# Patient Record
Sex: FEMALE | Race: BLACK OR AFRICAN AMERICAN | Employment: OTHER | URBAN - METROPOLITAN AREA
[De-identification: names, ages, dates, MRNs, and addresses within clinical notes are randomized per-mention and may not be internally consistent; named-entity substitution may affect disease eponyms.]

---

## 2018-01-24 ENCOUNTER — HOSPITAL ENCOUNTER (OUTPATIENT)
Dept: GENERAL RADIOLOGY | Age: 63
Discharge: HOME | End: 2018-01-24
Payer: MEDICARE

## 2018-01-24 DIAGNOSIS — M25.511 SHOULDER PAIN, RIGHT: ICD-10-CM

## 2018-01-24 PROCEDURE — 73030 X-RAY EXAM OF SHOULDER: CPT

## 2018-09-19 ENCOUNTER — OFFICE VISIT (OUTPATIENT)
Dept: SLEEP MEDICINE | Age: 63
End: 2018-09-19

## 2018-09-19 VITALS
DIASTOLIC BLOOD PRESSURE: 82 MMHG | HEIGHT: 66 IN | BODY MASS INDEX: 36.32 KG/M2 | HEART RATE: 75 BPM | WEIGHT: 226 LBS | SYSTOLIC BLOOD PRESSURE: 149 MMHG | OXYGEN SATURATION: 95 %

## 2018-09-19 DIAGNOSIS — I10 ESSENTIAL HYPERTENSION: ICD-10-CM

## 2018-09-19 DIAGNOSIS — E66.01 SEVERE OBESITY (BMI 35.0-39.9): ICD-10-CM

## 2018-09-19 DIAGNOSIS — G47.33 OBSTRUCTIVE SLEEP APNEA (ADULT) (PEDIATRIC): Primary | ICD-10-CM

## 2018-09-19 RX ORDER — PRAVASTATIN SODIUM 40 MG/1
40 TABLET ORAL
COMMUNITY

## 2018-09-19 RX ORDER — ASPIRIN 81 MG/1
TABLET ORAL
COMMUNITY

## 2018-09-19 RX ORDER — GABAPENTIN 300 MG/1
300 CAPSULE ORAL 3 TIMES DAILY
COMMUNITY

## 2018-09-19 RX ORDER — METOPROLOL TARTRATE 25 MG/1
TABLET, FILM COATED ORAL 2 TIMES DAILY
COMMUNITY
End: 2020-02-13

## 2018-09-19 RX ORDER — LISINOPRIL 2.5 MG/1
2.5 TABLET ORAL 2 TIMES DAILY
COMMUNITY

## 2018-09-19 RX ORDER — TRAMADOL HYDROCHLORIDE 50 MG/1
50 TABLET ORAL
Status: ON HOLD | COMMUNITY
End: 2021-10-14 | Stop reason: SDUPTHER

## 2018-09-19 NOTE — PROGRESS NOTES
217 [facility] Benji. [place], [address]  Tel.  [phone]  Fax. 100 [facility] 60  [place], [address] [facility]  Tel.  [phone]  Fax. [phone] [address] Robert [address]  Tel.  [phone]  Fax. [phone]         Subjective:      Price Centeno is an 59 y.o. female referred for evaluation for a sleep disorder. She complains of needing a new CPAP machine since her's broke associated with snoring, periods of not breathing, excessive daytime sleepiness. Symptoms began 2 months ago, unchanged since that time. She usually can fall asleep in 10 minutes. Family or house members note snoring, periods of not breathing. She denies falling asleep while driving. Price Centeno does wake up frequently at night. She is bothered by waking up too early and left unable to get back to sleep. She actually sleeps about 4 hours at night and wakes up about 2 times during the night. She does work shifts:  First Shift. [place] Cesar indicates she does not get too little sleep at night. Her bedtime is 2000. She awakens at 0600. She does take naps. She takes 7 naps a week lasting 3. She has the following observed behaviors: Loud snoring, Sleep talking, Pauses in breathing, Biting tongue, Twitching of legs or feet;  . Other remarks: hallucinations  she was diagnosed with sleep apnea 10 years ago. she was started on CPAP at a setting of ? cm H20.  she was using it regularly until it broke. Her machine broke 2 months ago    Charlotte Sleepiness Score: 9      No Known Allergies      Current Outpatient Prescriptions:     aspirin delayed-release 81 mg tablet, Take  by mouth daily. , Disp: , Rfl:     pravastatin (PRAVACHOL) 40 mg tablet, Take 40 mg by mouth nightly., Disp: , Rfl:     metoprolol tartrate (LOPRESSOR) 25 mg tablet, Take  by mouth two (2) times a day., Disp: , Rfl:     gabapentin (NEURONTIN) 300 mg capsule, Take 300 mg by mouth three (3) times daily. , Disp: , Rfl:   lisinopril (PRINIVIL, ZESTRIL) 2.5 mg tablet, Take  by mouth daily. , Disp: , Rfl:     traMADol (ULTRAM) 50 mg tablet, Take 50 mg by mouth every six (6) hours as needed for Pain., Disp: , Rfl:     ibuprofen (MOTRIN) 400 mg tablet, Take 1 Tab by mouth every six (6) hours as needed for Pain., Disp: 20 Tab, Rfl: 0    loratadine (CLARITIN) 10 mg tablet, Take 10 mg by mouth daily. , Disp: , Rfl:     OTHER,NON-FORMULARY,, HTN medication but can't remember , Disp: , Rfl:     OTHER,NON-FORMULARY,, Medication for cholesterol can't remember , Disp: , Rfl:     ibuprofen (MOTRIN) 800 mg tablet, Take  by mouth every six (6) hours as needed. , Disp: , Rfl:     oxycodone-acetaminophen (PERCOCET) 5-325 mg per tablet, Take 1 Tab by mouth every four (4) hours as needed for Pain for 20 doses. , Disp: 20 Tab, Rfl: 0    diazepam (VALIUM) 5 mg tablet, Take 1 Tab by mouth three (3) times daily as needed for Anxiety (spasm). , Disp: 20 Tab, Rfl: 0     She  has a past medical history of Depression; High cholesterol; HTN (hypertension); Psychiatric disorder; and Seasonal allergies. She  has a past surgical history that includes knee arthroscp harv; pr ligate fallopian tube; and pr cardiac surg procedure unlist.    She family history includes Diabetes in her mother; Heart Disease in her mother; Hypertension in her mother. She  reports that she quit smoking about 12 years ago. She has never used smokeless tobacco. She reports that she does not drink alcohol or use illicit drugs. Review of Systems:  Constitutional: +weight gain. Eyes:  No blurred vision.   CVS:  No significant chest pain  Pulm:  No significant shortness of breath, unless she is moving too much  GI:  No significant nausea or vomiting  :  + significant nocturia  Musculoskeletal:  + significant joint pain at night  Skin:  No significant rashes  Neuro:  No significant dizziness   Psych:  No active mood issues    Sleep Review of Systems: notable for no difficulty falling asleep; infrequent awakenings at night;  regular dreaming noted; no nightmares ; + early morning headaches; no memory problems; no concentration issues; no history of any automobile or occupational accidents due to daytime drowsiness. Objective:     Visit Vitals    /82 (BP 1 Location: Left arm, BP Patient Position: Sitting)    Pulse 75    Ht 5' 6\" (1.676 m)    Wt 226 lb (102.5 kg)    SpO2 95%    BMI 36.48 kg/m2         General:   Not in acute distress   Eyes:  Anicteric sclerae, no obvious strabismus   Nose:  No obvious nasal septum deviation    Oropharynx:   Class 3 oropharyngeal outlet, thick tongue base, enlarged and boggy uvula, low-lying soft palate, narrow tonsilo-pharyngeal pilars   Tonsils:   tonsils are present and normal   Neck:   Neck circ. in \"inches\": 17; midline trachea   Chest/Lungs:  Equal lung expansion, clear on auscultation    CVS:  Normal rate, regular rhythm; no JVD   Skin:  Warm to touch; no obvious rashes   Neuro:  No focal deficits ; no obvious tremor    Psych:  Normal affect,  normal countenance;          Assessment:       ICD-10-CM ICD-9-CM    1. Obstructive sleep apnea (adult) (pediatric) G47.33 327.23 SLEEP STUDY UNATTENDED, 4 CHANNEL   2. Essential hypertension I10 401.9    3. Severe obesity (BMI 35.0-39.9) ([facility]) E66.01 278.01          Plan:     * The patient currently has a High Risk for having sleep apnea. STOP-BANG score 7.  * PSG was ordered for initial evaluation. I have reviewed the different types of sleep studies. Attended sleep studies and home sleep apnea tests. Home sleep testing tests only for the presence and severity of sleep apnea. she understands that if the HSAT does not provide reliable result(such as poor data/failed HSAT recording), she may have to repeat the HSAT or come in for an attended polysomnogram.     * She was provided information on sleep apnea including coresponding risk factors and the importance of proper treatment.   * Counseling was provided regarding proper sleep hygiene and safe driving. Treatment options for sleep apnea were reviewed. she is not against a trial of PAP if found to have significant sleep apnea. I will order an APAP for her. The treatment plan was reviewed with the patient in detail and reviewed with the patient and the lead technologist. she understands that the lead technologist will be calling her  with the results and assisting with the next step in the treatment plan as outlined today during the consultation with me. All of her questions were addressed. I have reviewed medicare requirements regarding PAP usage    2. Hypertension - she continues on her current regimen. I have reviewed the relationship between hypertension as it relates to sleep-disordered breathing. 3. Obesity - I have counseled the patient regarding the benefits of weight loss. Thank you for allowing us to participate in your patient's medical care. We'll keep you updated on these investigations.   Electronically signed by    Freida Mcdonald MD  Diplomate in Sleep Medicine  [facility]

## 2018-09-19 NOTE — PATIENT INSTRUCTIONS
217 [facility]., Benji. [place], [address]  Tel.  [phone]  Fax. 100 [facility] 60  [place], [address] [facility]  Tel.  [phone]  Fax. [phone] [address]  Tel.  [phone]  Fax. [phone]     Sleep Apnea: After Your Visit  Your Care Instructions  Sleep apnea occurs when you frequently stop breathing for 10 seconds or longer during sleep. It can be mild to severe, based on the number of times per hour that you stop breathing or have slowed breathing. Blocked or narrowed airways in your nose, mouth, or throat can cause sleep apnea. Your airway can become blocked when your throat muscles and tongue relax during sleep. Sleep apnea is common, occurring in 1 out of 20 individuals. Individuals having any of the following characteristics should be evaluated and treated right away due to high risk and detrimental consequences from untreated sleep apnea:  1. Obesity  2. Congestive Heart failure  3. Atrial Fibrillation  4. Uncontrolled Hypertension  5. Type II Diabetes  6. Night-time Arrhythmias  7. Stroke  8. Pulmonary Hypertension  9. High-risk Driving Populations (pilots, truck drivers, etc.)  10. Patients Considering Weight-loss Surgery    How do you know you have sleep apnea? You probably have sleep apnea if you answer 'yes' to 3 or more of the following questions:  S - Have you been told that you Snore? T - Are you often Tired during the day? O - Has anyone Observed you stop breathing while sleeping? P- Do you have (or are being treated for) high blood Pressure? B - Are you obese (Body Mass Index > 35)? A - Is your Age 48years old or older? N - Is your Neck size greater than 16 inches? G - Are you male Gender? A sleep physician can prescribe a breathing device that prevents tissues in the throat from blocking your airway.  Or your doctor may recommend using a dental device (oral breathing device) to help keep your airway open. In some cases, surgery may be needed to remove enlarged tissues in the throat. Follow-up care is a key part of your treatment and safety. Be sure to make and go to all appointments, and call your doctor if you are having problems. It's also a good idea to know your test results and keep a list of the medicines you take. How can you care for yourself at home? · Lose weight, if needed. It may reduce the number of times you stop breathing or have slowed breathing. · Go to bed at the same time every night. · Sleep on your side. It may stop mild apnea. If you tend to roll onto your back, sew a pocket in the back of your pajama top. Put a tennis ball into the pocket, and stitch the pocket shut. This will help keep you from sleeping on your back. · Avoid alcohol and medicines such as sleeping pills and sedatives before bed. · Do not smoke. Smoking can make sleep apnea worse. If you need help quitting, talk to your doctor about stop-smoking programs and medicines. These can increase your chances of quitting for good. · Prop up the head of your bed 4 to 6 inches by putting bricks under the legs of the bed. · Treat breathing problems, such as a stuffy nose, caused by a cold or allergies. · Use a continuous positive airway pressure (CPAP) breathing machine if lifestyle changes do not help your apnea and your doctor recommends it. The machine keeps your airway from closing when you sleep. · If CPAP does not help you, ask your doctor whether you should try other breathing machines. A bilevel positive airway pressure machine has two types of air pressureâone for breathing in and one for breathing out. Another device raises or lowers air pressure as needed while you breathe. · If your nose feels dry or bleeds when using one of these machines, talk with your doctor about increasing moisture in the air. A humidifier may help.   · If your nose is runny or stuffy from using a breathing machine, talk with your doctor about using decongestants or a corticosteroid nasal spray. When should you call for help? Watch closely for changes in your health, and be sure to contact your doctor if:  · You still have sleep apnea even though you have made lifestyle changes. · You are thinking of trying a device such as CPAP. · You are having problems using a CPAP or similar machine. Where can you learn more? Go to [employer]. Enter C399 in the search box to learn more about \"Sleep Apnea: After Your Visit. \"   © 0254-2316 Healthwise, [employer]. Care instructions adapted under license by [employer] (which disclaims liability or warranty for this information). This care instruction is for use with your licensed healthcare professional. If you have questions about a medical condition or this instruction, always ask your healthcare professional. Shahzad Keene any warranty or liability for your use of this information. PROPER SLEEP HYGIENE    What to avoid  · Do not have drinks with caffeine, such as coffee or black tea, for 8 hours before bed. · Do not smoke or use other types of tobacco near bedtime. Nicotine is a stimulant and can keep you awake. · Avoid drinking alcohol late in the evening, because it can cause you to wake in the middle of the night. · Do not eat a big meal close to bedtime. If you are hungry, eat a light snack. · Do not drink a lot of water close to bedtime, because the need to urinate may wake you up during the night. · Do not read or watch TV in bed. Use the bed only for sleeping and sexual activity. What to try  · Go to bed at the same time every night, and wake up at the same time every morning. Do not take naps during the day. · Keep your bedroom quiet, dark, and cool. · Get regular exercise, but not within 3 to 4 hours of your bedtime. .  · Sleep on a comfortable pillow and mattress.   · If watching the clock makes you anxious, turn it facing away from you so you cannot see the time. · If you worry when you lie down, start a worry book. Well before bedtime, write down your worries, and then set the book and your concerns aside. · Try meditation or other relaxation techniques before you go to bed. · If you cannot fall asleep, get up and go to another room until you feel sleepy. Do something relaxing. Repeat your bedtime routine before you go to bed again. · Make your house quiet and calm about an hour before bedtime. Turn down the lights, turn off the TV, log off the computer, and turn down the volume on music. This can help you relax after a busy day. Drowsy Driving  The [address] [place] [address] Road Traffic Safety Administration cites drowsiness as a causing factor in more than 514,456 police reported crashes annually, resulting in 76,000 injuries and 1,500 deaths. Other surveys suggest 55% of people polled have driven while drowsy in the past year, 23% had fallen asleep but not crashed, 3% crashed, and 2% had and accident due to drowsy driving. Who is at risk? Young Drivers: One study of drowsy driving accidents states that 55% of the drivers were under 25 years. Of those, 75% were male. Shift Workers and Travelers: People who work overnight or travel across time zones frequently are at higher risk of experiencing Circadian Rhythm Disorders. They are trying to work and function when their body is programed to sleep. Sleep Deprived: Lack of sleep has a serious impact on your ability to pay attention or focus on a task. Consistently getting less than the average of 8 hours your body needs creates partial or cumulative sleep deprivation. Untreated Sleep Disorders: Sleep Apnea, Narcolepsy, R.L.S., and other sleep disorders (untreated) prevent a person from getting enough restful sleep. This leads to excessive daytime sleepiness and increases the risk for drowsy driving accidents by up to 7 times.   Medications / Alcohol: Even over the counter medications can cause drowsiness. Medications that impair a drivers attention should have a warning label. Alcohol naturally makes you sleepy and on its own can cause accidents. Combined with excessive drowsiness its effects are amplified. Signs of Drowsy Driving:   * You don't remember driving the last few miles   * You may drift out of your junior   * You are unable to focus and your thoughts wander   * You may yawn more often than normal   * You have difficulty keeping your eyes open / nodding off   * Missing traffic signs, speeding, or tailgating  Prevention-   Good sleep hygiene, lifestyle and behavioral choices have the most impact on drowsy driving. There is no substitute for sleep and the average person requires 8 hours nightly. If you find yourself driving drowsy, stop and sleep. Consider the sleep hygiene tips provided during your visit as well. Medication Refill Policy: Refills for all medications require 1 week advance notice. Please have your pharmacy fax a refill request. We are unable to fax, or call in \"controled substance\" medications and you will need to pick these prescriptions up from our office. Kin Community Activation    Thank you for requesting access to Kin [facility]. Please follow the instructions below to securely access and download your online medical record. Kin Community allows you to send messages to your doctor, view your test results, renew your prescriptions, schedule appointments, and more. How Do I Sign Up? 1. In your internet browser, go to [email or web address]. 2. Click on the First Time User? Click Here link in the Sign In box. You will see the New Member Sign Up page. 3. Enter your Kin Community Access Code exactly as it appears below. You will not need to use this code after youve completed the sign-up process. If you do not sign up before the expiration date, you must request a new code.     Kin Community Access Code: [email or web address]  Expires: 12/11/2018  1:49 PM (This is the date your MessageMe access code will )    4. Enter the last four digits of your Social Security Number (xxxx) and Date of Birth (mm/dd/yyyy) as indicated and click Submit. You will be taken to the next sign-up page. 5. Create a [employer] IIt ID. This will be your MessageMe login ID and cannot be changed, so think of one that is secure and easy to remember. 6. Create a MessageMe password. You can change your password at any time. 7. Enter your Password Reset Question and Answer. This can be used at a later time if you forget your password. 8. Enter your e-mail address. You will receive e-mail notification when new information is available in [address] 9. Click Sign Up. You can now view and download portions of your medical record. 10. Click the Download Summary menu link to download a portable copy of your medical information. Additional Information    If you have questions, please call [phone]. Remember, MessageMe is NOT to be used for urgent needs. For medical emergencies, dial 911.

## 2018-09-19 NOTE — Clinical Note
Thank you for the referral.  I will keep you informed of her progress.  [address], [place]

## 2018-09-20 ENCOUNTER — HOSPITAL ENCOUNTER (OUTPATIENT)
Dept: SLEEP MEDICINE | Age: 63
Discharge: HOME | End: 2018-09-20
Payer: MEDICARE

## 2018-09-20 PROCEDURE — 95806 SLEEP STUDY UNATT&RESP EFFT: CPT | Performed by: INTERNAL MEDICINE

## 2018-09-24 ENCOUNTER — DOCUMENTATION ONLY (OUTPATIENT)
Dept: SLEEP MEDICINE | Age: 63
End: 2018-09-24

## 2019-07-17 ENCOUNTER — HOSPITAL ENCOUNTER (OUTPATIENT)
Dept: GENERAL RADIOLOGY | Age: 64
Discharge: HOME | End: 2019-07-17
Payer: MEDICARE

## 2019-07-17 DIAGNOSIS — M25.569 KNEE PAIN: ICD-10-CM

## 2019-07-17 PROCEDURE — 73562 X-RAY EXAM OF KNEE 3: CPT

## 2019-09-11 ENCOUNTER — HOSPITAL ENCOUNTER (OUTPATIENT)
Dept: ULTRASOUND IMAGING | Age: 64
Discharge: HOME | End: 2019-09-11
Attending: FAMILY MEDICINE
Payer: MEDICARE

## 2019-09-11 DIAGNOSIS — N95.0 POSTMENOPAUSAL BLEEDING: ICD-10-CM

## 2019-09-11 PROCEDURE — 76856 US EXAM PELVIC COMPLETE: CPT

## 2019-09-11 PROCEDURE — 76830 TRANSVAGINAL US NON-OB: CPT

## 2019-12-12 ENCOUNTER — HOSPITAL ENCOUNTER (OUTPATIENT)
Dept: GENERAL RADIOLOGY | Age: 64
Discharge: HOME | End: 2019-12-12
Payer: MEDICARE

## 2019-12-12 DIAGNOSIS — M25.539 WRIST PAIN: ICD-10-CM

## 2019-12-12 PROCEDURE — 73110 X-RAY EXAM OF WRIST: CPT

## 2019-12-19 ENCOUNTER — OFFICE VISIT (OUTPATIENT)
Dept: OBGYN CLINIC | Age: 64
End: 2019-12-19

## 2019-12-19 VITALS — WEIGHT: 220 LBS | HEIGHT: 66 IN | BODY MASS INDEX: 35.36 KG/M2

## 2019-12-19 DIAGNOSIS — N95.0 POSTMENOPAUSAL BLEEDING: ICD-10-CM

## 2019-12-19 DIAGNOSIS — Z01.411 ENCOUNTER FOR GYNECOLOGICAL EXAMINATION WITH ABNORMAL FINDING: Primary | ICD-10-CM

## 2019-12-19 DIAGNOSIS — Z12.4 PAP SMEAR FOR CERVICAL CANCER SCREENING: ICD-10-CM

## 2019-12-19 NOTE — PROGRESS NOTES
Annual exam ages 40-58    Luis Bermudez is a No obstetric history on file. ,  62 y.o. female [address] No LMP recorded. Patient is postmenopausal..    She presents for her annual checkup. She is having bleeding. Bleeding about 3 months ago. Lasted for about a month intermittently. Not heavy. No pelvic pain, but pain around her waist.    Ultrasound showed thickened endometrium:  History: Postmenopausal bleeding      Ultrasonography of the pelvis was performed transabdominally and endovaginally. Uterus measures 9.5 x 4.7 x 5.2 cm. Endometrial stripe measures 16.5 mm. The  ovaries are obscured transabdominally.     Endovaginally, the uterus measures 9.0 x 4.9 x 4.9 cm. The endometrial stripe  measures 15.9 mm. The left ovary measures 3.7 x 2.4 x 2.2 cm. The right ovary is  obscured by bowel gas. There is normal left ovarian blood flow.     IMPRESSION  IMPRESSION: Thickened endometrium. Endometrial biopsy recommended.     23X       With regard to the Gardasil vaccine, she is older than the age for which it is FDA approved. Menstrual status:    Postmenopausal for about 10 years. Sexual history:    She  has no history on file for sexual activity. Medical conditions:    Since her last annual GYN exam about three or more years ago, she has not the following changes in her health history: none. Pap and Mammogram History:    Her most recent Pap smear was not for a while. No hx of abnormal paps. The patient with normal mammogram about a year ago. Has a mammogram slip from PCP    Breast Cancer History/Substance Abuse: negative    Osteoporosis History:    Family history does not include a first or second degree relative with osteopenia or osteoporosis. She is not currently taking calcium and vit D.     Past Medical History:   Diagnosis Date    Depression     High cholesterol     HTN (hypertension)     Psychiatric disorder     Seasonal allergies      Past Surgical History: Procedure Laterality Date    CARDIAC SURG PROCEDURE UNLIST      KNEE ARTHROSCP HARV      LIGATE FALLOPIAN TUBE         Current Outpatient Medications   Medication Sig Dispense Refill    aspirin delayed-release 81 mg tablet Take  by mouth daily.  pravastatin (PRAVACHOL) 40 mg tablet Take 40 mg by mouth nightly.  metoprolol tartrate (LOPRESSOR) 25 mg tablet Take  by mouth two (2) times a day.  gabapentin (NEURONTIN) 300 mg capsule Take 300 mg by mouth three (3) times daily.  lisinopril (PRINIVIL, ZESTRIL) 2.5 mg tablet Take  by mouth daily.  traMADol (ULTRAM) 50 mg tablet Take 50 mg by mouth every six (6) hours as needed for Pain.  ibuprofen (MOTRIN) 400 mg tablet Take 1 Tab by mouth every six (6) hours as needed for Pain. 20 Tab 0    loratadine (CLARITIN) 10 mg tablet Take 10 mg by mouth daily. Kathrine Miller, HTN medication but can't remember       OTHER,NON-FORMULARY, Medication for cholesterol can't remember       ibuprofen (MOTRIN) 800 mg tablet Take  by mouth every six (6) hours as needed.  oxycodone-acetaminophen (PERCOCET) 5-325 mg per tablet Take 1 Tab by mouth every four (4) hours as needed for Pain for 20 doses. 20 Tab 0    diazepam (VALIUM) 5 mg tablet Take 1 Tab by mouth three (3) times daily as needed for Anxiety (spasm). 20 Tab 0     Allergies: Patient has no known allergies. Tobacco History:  reports that she quit smoking about 13 years ago. She has never used smokeless tobacco.  Alcohol Abuse:  reports no history of alcohol use. Drug Abuse:  reports no history of drug use. Patient feels safe at home.     Family Medical/Cancer History:   Family History   Problem Relation Age of Onset    Hypertension Mother     Heart Disease Mother     Diabetes Mother         Review of Systems - History obtained from the patient  Constitutional: negative for weight loss, fever, night sweats  HEENT: negative for hearing loss, earache, congestion, snoring, sorethroat  CV: negative for chest pain, palpitations, edema  Resp: negative for cough, shortness of breath, wheezing  GI: negative for change in bowel habits, abdominal pain, black or bloody stools  : negative for frequency, dysuria, hematuria, vaginal discharge  MSK: negative for back pain, joint pain, muscle pain  Breast: negative for breast lumps, nipple discharge, galactorrhea  Skin :negative for itching, rash, hives  Neuro: negative for dizziness, headache, confusion, weakness  Psych: negative for anxiety, depression, change in mood  Heme/lymph: negative for bleeding, bruising, pallor    Physical Exam    Visit Vitals  Ht 5' 6\" (1.676 m)   Wt 220 lb (99.8 kg)   BMI 35.51 kg/m²       Constitutional  · Appearance: well-nourished, well developed, alert, in no acute distress    HENT  · Head and Face: appears normal    Neck  · Inspection/Palpation: normal appearance, no masses or tenderness  · Lymph Nodes: no lymphadenopathy present  · Thyroid: gland size normal, nontender, no nodules or masses present on palpation    Chest  · Respiratory Effort: breathing unlabored  · Auscultation: normal breath sounds    Cardiovascular  · Heart:  · Auscultation: regular rate and rhythm without murmur    Breasts  · Inspection of Breasts: breasts symmetrical, no skin changes, no discharge present, nipple appearance normal, no skin retraction present  · Palpation of Breasts and Axillae: no masses present on palpation, no breast tenderness  · Axillary Lymph Nodes: no lymphadenopathy present    Gastrointestinal  · Abdominal Examination: abdomen non-tender to palpation, normal bowel sounds, no masses present  · Liver and spleen: no hepatomegaly present, spleen not palpable  · Hernias: no hernias identified    Genitourinary  · External Genitalia: normal appearance for age, no discharge present, no tenderness present, no inflammatory lesions present, no masses present, no atrophy present  · Vagina: normal vaginal vault without central or paravaginal defects, no discharge present, no inflammatory lesions present, no masses present  · Bladder: non-tender to palpation  · Urethra: appears normal  · Cervix: normal   · Uterus: normal size, shape and consistency  · Adnexa: no adnexal tenderness present, no adnexal masses present  · Perineum: perineum within normal limits, no evidence of trauma, no rashes or skin lesions present  · Anus: anus within normal limits, no hemorrhoids present  · Inguinal Lymph Nodes: no lymphadenopathy present    Skin  · General Inspection: no rash, no lesions identified    Neurologic/Psychiatric  · Mental Status:  · Orientation: grossly oriented to person, place and time  · Mood and Affect: mood normal, affect appropriate    Assessment:  Routine gynecologic examination  Her current medical status is satisfactory with no evidence of significant gynecologic issues. Plan:  - pap smear today  - mammogram slip by PCP  - will schedule for Hysteroscopy, D&C, Myosure polypectomy. Risks and benefits of surgery reviewed. This includes pain, bleeding, infection, and risk of damage to surrounding organs. All questions answered. Consent signed.       Counseled re: diet, exercise, healthy lifestyle  Return for yearly wellness visits  Rec annual mammogram

## 2019-12-19 NOTE — PROGRESS NOTES
Chief Complaint   Patient presents with    Well Woman     Pt presents in office for annual exam with c/o pelvic pain 3 months ago. Last pap a while per pt. Last mammogram 1/19.       3 most recent PHQ Screens 12/19/2019   Little interest or pleasure in doing things Not at all   Feeling down, depressed, irritable, or hopeless Not at all   Total Score PHQ 2 0     ]1. Have you been to the ER, urgent care clinic since your last visit? Hospitalized since your last visit? No    2. Have you seen or consulted any other health care providers outside of the [address] [place] [address] since your last visit? Include any pap smears or colon screening.  No

## 2019-12-19 NOTE — PATIENT INSTRUCTIONS
Well Visit, Women 48 to 72: Care Instructions Your Care Instructions Physical exams can help you stay healthy. Your doctor has checked your overall health and may have suggested ways to take good care of yourself. He or she also may have recommended tests. At home, you can help prevent illness with healthy eating, regular exercise, and other steps. Follow-up care is a key part of your treatment and safety. Be sure to make and go to all appointments, and call your doctor if you are having problems. It's also a good idea to know your test results and keep a list of the medicines you take. How can you care for yourself at home? · Reach and stay at a healthy weight. This will lower your risk for many problems, such as obesity, diabetes, heart disease, and high blood pressure. · Get at least 30 minutes of exercise on most days of the week. Walking is a good choice. You also may want to do other activities, such as running, swimming, cycling, or playing tennis or team sports. · Do not smoke. Smoking can make health problems worse. If you need help quitting, talk to your doctor about stop-smoking programs and medicines. These can increase your chances of quitting for good. · Protect your skin from too much sun. When you're outdoors from 10 a.m. to 4 p.m., stay in the shade or cover up with clothing and a hat with a wide brim. Wear sunglasses that block UV rays. Even when it's cloudy, put broad-spectrum sunscreen (SPF 30 or higher) on any exposed skin. · See a dentist one or two times a year for checkups and to have your teeth cleaned. · Wear a seat belt in the car. Follow your doctor's advice about when to have certain tests. These tests can spot problems early. · Cholesterol. Your doctor will tell you how often to have this done based on your age, family history, or other things that can increase your risk for heart attack and stroke. · Blood pressure. Have your blood pressure checked during a routine doctor visit. Your doctor will tell you how often to check your blood pressure based on your age, your blood pressure results, and other factors. · Mammogram. Ask your doctor how often you should have a mammogram, which is an X-ray of your breasts. A mammogram can spot breast cancer before it can be felt and when it is easiest to treat. · Pap test and pelvic exam. Ask your doctor how often you should have a Pap test. You may not need to have a Pap test as often as you used to. · Vision. Have your eyes checked every year or two or as often as your doctor suggests. Some experts recommend that you have yearly exams for glaucoma and other age-related eye problems starting at age 48. · Hearing. Tell your doctor if you notice any change in your hearing. You can have tests to find out how well you hear. · Diabetes. Ask your doctor whether you should have tests for diabetes. · Colorectal cancer. Your risk for colorectal cancer gets higher as you get older. Some experts say that adults should start regular screening at age 48 and stop at age 76. Others say to start before age 48 or continue after age 76. Talk with your doctor about your risk and when to start and stop screening. · Thyroid disease. Talk to your doctor about whether to have your thyroid checked as part of a regular physical exam. Women have an increased chance of a thyroid problem. · Osteoporosis. You should begin tests for bone density at age 72. If you are younger than 72, ask your doctor whether you have factors that may increase your risk for this disease. You may want to have this test before age 72. · Heart attack and stroke risk. At least every 4 to 6 years, you should have your risk for heart attack and stroke assessed.  Your doctor uses factors such as your age, blood pressure, cholesterol, and whether you smoke or have diabetes to show what your risk for a heart attack or stroke is over the next 10 years. When should you call for help? Watch closely for changes in your health, and be sure to contact your doctor if you have any problems or symptoms that concern you. Where can you learn more? Go to http://alvina-lizbeth.info/. Enter Y062 in the search box to learn more about \"Well Visit, Women 50 to 72: Care Instructions. \" Current as of: December 13, 2018 Content Version: 12.2 © 3992-1830 [employer]. Care instructions adapted under license by [employer] (which disclaims liability or warranty for this information). If you have questions about a medical condition or this instruction, always ask your healthcare professional. [address] any warranty or liability for your use of this information. Operative Hysteroscopy: Before Your Procedure What is an operative hysteroscopy? An operative hysteroscopy is a procedure to find and treat problems with your uterus. It may be done to remove growths from the uterus. It may also be used to treat fertility problems or abnormal bleeding. The doctor will guide a lighted tube through the cervix and into the uterus. This tube is called a hysteroscope, or scope. The doctor will fill your uterus with liquid. This makes it easier to see the inside of your uterus with the scope. Then the doctor will put tools through the scope to do the procedure. Most of the liquid that the doctor puts in will flow out when the scope is removed. You will most likely go home the same day. Many women are able to go back to work the next day. But it depends on what was done and the type of work you do. Follow-up care is a key part of your treatment and safety. Be sure to make and go to all appointments, and call your doctor if you are having problems.  It's also a good idea to know your test results and keep a list of the medicines you take. What happens before the procedure? 
 Preparing for the procedure 
  · Understand exactly what procedure is planned, along with the risks, benefits, and other options. · Tell your doctors ALL the medicines, vitamins, supplements, and herbal remedies you take. Some of these can increase the risk of bleeding or interact with anesthesia.  
  · If you take blood thinners, such as warfarin (Coumadin), clopidogrel (Plavix), or aspirin, be sure to talk to your doctor. He or she will tell you if you should stop taking these medicines before your procedure. Make sure that you understand exactly what your doctor wants you to do.  
  · Your doctor will tell you which medicines to take or stop before your procedure. You may need to stop taking certain medicines a week or more before the procedure. So talk to your doctor as soon as you can.  
  · If you have an advance directive, let your doctor know. It may include a living will and a durable power of  for health care. Bring a copy to the hospital. If you don't have one, you may want to prepare one. It lets your doctor and loved ones know your health care wishes. Doctors advise that everyone prepare these papers before any type of surgery or procedure. Procedures can be stressful. This information will help you understand what you can expect. And it will help you safely prepare for your procedure. What happens on the day of the procedure? · Follow the instructions exactly about when to stop eating and drinking. If you don't, your procedure may be canceled. If your doctor has instructed you to take your medicines on the day of the procedure, take them with only a sip of water.  
  · Take a bath or shower before you come in for your procedure. Do not apply lotions, perfumes, deodorants, or nail polish.  
  · Take off all jewelry and piercings. And take out contact lenses, if you wear them.  
 At the hospital or surgery center · Bring a picture ID.  
  · You will be kept comfortable and safe by your anesthesia provider. The anesthesia may make you sleep. Or it may just numb the area being worked on.  
  · The procedure will probably take less than an hour. Going home · Be sure you have someone to drive you home. Anesthesia and pain medicine make it unsafe for you to drive.  
  · You will be given more specific instructions about recovering from your procedure. They will cover things like diet, wound care, follow-up care, driving, and getting back to your normal routine. When should you call your doctor? · You have questions or concerns.  
  · You don't understand how to prepare for your procedure.  
  · You become ill before the procedure (such as fever, flu, or a cold).  
  · You need to reschedule or have changed your mind about having the procedure. Where can you learn more? Go to http://alvinaWyooslizbeth.info/. Enter K087 in the search box to learn more about \"Operative Hysteroscopy: Before Your Procedure. \" Current as of: February 19, 2019 Content Version: 12.2 © 5279-6725 [employer]. Care instructions adapted under license by [employer] (which disclaims liability or warranty for this information). If you have questions about a medical condition or this instruction, always ask your healthcare professional. [address] any warranty or liability for your use of this information. Operative Hysteroscopy: What to Expect at [facility] Your Recovery An operative hysteroscopy is a procedure to find and treat problems with your uterus. It may have been done to remove growths from the uterus. Or it may have been done to treat fertility problems or abnormal bleeding. You may have cramps and vaginal bleeding for several days.  If the doctor filled your uterus with air during the procedure, you may have gas pains, a feeling of fullness in your belly, or shoulder pain. These symptoms usually go away in 1 or 2 days. If the doctor filled your uterus with liquid during the procedure, you may have watery vaginal discharge for a few days. Many women are able to return to work on the day after the procedure. But it depends on what was done during the procedure and the type of work you do. This care sheet gives you a general idea about how long it will take for you to recover. But each person recovers at a different pace. Follow the steps below to get better as quickly as possible. How can you care for yourself at home? Activity 
  · Rest when you feel tired. Getting enough sleep will help you recover.  
  · Most women are able to return to work on the day after the procedure.  
  · You may shower and take baths as usual.  
  · Ask your doctor when it is okay for you to have sex.  
  · Talk about birth control with your doctor. Do not try to become pregnant until your doctor says it is okay. Diet 
  · You can eat your normal diet. If your stomach is upset, try bland, low-fat foods like plain rice, broiled chicken, toast, and yogurt.  
  · You may notice that your bowel movements are not regular right after the procedure. This is common. Try to avoid constipation and straining with bowel movements. You may want to take a fiber supplement every day. If you have not had a bowel movement after a couple of days, ask your doctor about taking a mild laxative. Medicines 
  · Your doctor will tell you if and when you can restart your medicines. He or she will also give you instructions about taking any new medicines.  
  · If you take blood thinners, such as warfarin (Coumadin), clopidogrel (Plavix), or aspirin, be sure to talk to your doctor. He or she will tell you if and when to start taking those medicines again. Make sure that you understand exactly what your doctor wants you to do.   · Be safe with medicines. Take pain medicines exactly as directed. ? If the doctor gave you a prescription medicine for pain, take it as prescribed. ? If you are not taking a prescription pain medicine, ask your doctor if you can take an over-the-counter medicine. ? Do not take two or more pain medicines at the same time unless the doctor told you to. Many pain medicines have acetaminophen, which is Tylenol. Too much acetaminophen (Tylenol) can be harmful.  
  · If you think your pain medicine is making you sick to your stomach: 
? Take your medicine after meals (unless your doctor has told you not to). ? Ask your doctor for a different pain medicine.  
  · If your doctor prescribed antibiotics, take them as directed. Do not stop taking them just because you feel better. You need to take the full course of antibiotics. Other instructions 
  · You may have some light vaginal bleeding. Wear sanitary pads if needed. Do not douche or use tampons until your doctor says it is okay.  
  · You may want to use a heating pad on your belly to help with pain. Use a low heat setting. Follow-up care is a key part of your treatment and safety. Be sure to make and go to all appointments, and call your doctor if you are having problems. It's also a good idea to know your test results and keep a list of the medicines you take. When should you call for help? Call 911 anytime you think you may need emergency care. For example, call if: 
  · You pass out (lose consciousness).  
  · You have chest pain, are short of breath, or cough up blood.  
 Call your doctor now or seek immediate medical care if: 
  · You have bright red vaginal bleeding that soaks one or more pads in an hour, or you have large clots.  
  · You have vaginal discharge that has increased in amount or smells bad.  
  · You are sick to your stomach or cannot drink fluids.  
  · You have pain that does not get better after you take pain medicine.   · You have signs of infection, such as: 
? Increased pain, swelling, warmth, or redness. ? A fever.  
  · You cannot pass stools or gas.  
  · You have signs of a blood clot in your leg (called a deep vein thrombosis), such as: 
? Pain in your calf, back of the knee, thigh, or groin. ? Redness and swelling in your leg.  
 Watch closely for changes in your health, and be sure to contact your doctor if you have any problems. Where can you learn more? Go to http://alvina-lizbeth.info/. Enter C270 in the search box to learn more about \"Operative Hysteroscopy: What to Expect at Home. \" Current as of: February 19, 2019 Content Version: 12.2 © 7826-8668 [employer]. Care instructions adapted under license by [employer] (which disclaims liability or warranty for this information). If you have questions about a medical condition or this instruction, always ask your healthcare professional. [place] [address] any warranty or liability for your use of this information. Learning About Cervical Cancer Screening What is a cervical cancer screening test? 
 
The cervix is the lower part of the uterus that opens into the vagina. Cervical cancer screening tests check the cells on the cervix for changes that could lead to cancer. Two tests can be used to screen for cervical cancer. They may be used alone or together. A Pap test.  
This test looks for changes in the cells of the cervix. Some of these cell changes could lead to cancer. A human papillomavirus (HPV) test.  
This test looks for the HPV virus. Some high-risk types of HPV can cause cell changes that could lead to cervical cancer. During either test, the doctor or nurse will insert a tool called a speculum into your vagina. The speculum gently opens the vaginal walls. It allows your doctor to see inside the vagina and the cervix.  He or she uses a small swab or brush to collect cell samples from your cervix. Try to schedule the test when you're not having your period. To get ready for the test, your doctor may ask you to use a condom if you have sex before the test. Your doctor may also say to avoid douches, tampons, vaginal medicines, sprays, and powders for at least a day before you have the test. 
When should you have a screening test? 
Talk with your doctor about cervical cancer screening. If you are a woman with an average risk for cervical cancer, your doctor will likely suggest starting screening at age 24. Women 21 to 34 If you are in this age group, your doctor will likely suggest that you get a Pap test. If your Pap test results are normal, you can wait 3 years to have another test. 
Women 30 to 59 Screening options for women in this age group may include: · A Pap test. If your results are normal, you can wait 3 years to have another test. 
· An HPV test. If your results are normal, you can wait 5 years to have another test. 
· A Pap test and an HPV test. Having both tests is called co-testing. If your results are normal, you can wait 5 years to be tested again. Women 72 and older · If you are age 72 or older, talk with your doctor about what's right for you. Women ages 72 and older may no longer need to be screened for cervical cancer. When to stop having screening tests depends on your medical history, your overall health, and your risk of cervical cell changes or cervical cancer. What happens after the test? 
The sample of cells taken during your test will be sent to a lab so that an expert can look at the cells. It usually takes a week or two to get the results back. Pap tests · A normal result means that the test didn't find any abnormal cells in the sample. · An abnormal result can mean many things. Most of these aren't cancer. The results of your test may be abnormal because: ? You have an infection of the vagina or cervix, such as a yeast infection. ? You have low estrogen levels after menopause that are causing the cells to change. ? You have cell changes that may be a sign of precancer or cancer. The results are ranked based on how serious the changes might be. HPV tests · A negative result means that the test didn't find any high-risk HPV in the sample. · A positive HPV test means that at least one type of high-risk HPV was found. It doesn't mean that you have cervical cancer, but it increases your chance of having cell changes that could lead to cancer. Follow-up care is a key part of your treatment and safety. Be sure to make and go to all appointments, and call your doctor if you are having problems. It's also a good idea to know your test results and keep a list of the medicines you take. Where can you learn more? Go to http://alvina-lizbeth.info/. Enter P919 in the search box to learn more about \"Learning About Cervical Cancer Screening. \" Current as of: December 19, 2018 Content Version: 12.2 © 0407-9925 [employer]. Care instructions adapted under license by [employer] (which disclaims liability or warranty for this information). If you have questions about a medical condition or this instruction, always ask your healthcare professional. [address] any warranty or liability for your use of this information. Vaginal Bleeding After Menopause: Care Instructions Your Care Instructions Vaginal bleeding after menopause can have many causes. Causes may include infection, inflammation, prescription hormones, abnormal growths, and injury. Your doctor may want you to have more tests to find the cause of your vaginal bleeding. Follow-up care is a key part of your treatment and safety.  Be sure to make and go to all appointments, and call your doctor if you are having problems. It's also a good idea to know your test results and keep a list of the medicines you take. How can you care for yourself at home? · If your doctor gave you medicine, take it exactly as prescribed. Call your doctor if you think you are having a problem with your medicine. · Do not have sex or put anything inside your vagina until you talk with your doctor. · Do not douche. When should you call for help? Call 911 anytime you think you may need emergency care. For example, call if: 
  · You passed out (lost consciousness).  
 Call your doctor now or seek immediate medical care if: 
  · You have severe vaginal bleeding.  
  · You are dizzy or lightheaded, or you feel like you may faint.  
  · You have new or worse belly or pelvic pain.  
 Watch closely for changes in your health, and be sure to contact your doctor if: 
  · Your bleeding gets worse.  
  · You think you might be pregnant.  
  · You do not get better as expected. Where can you learn more? Go to http://alvina-lizbeth.info/. Enter N304 in the search box to learn more about \"Vaginal Bleeding After Menopause: Care Instructions. \" Current as of: February 19, 2019 Content Version: 12.2 © 0610-6556 [employer]. Care instructions adapted under license by [employer] (which disclaims liability or warranty for this information). If you have questions about a medical condition or this instruction, always ask your healthcare professional. [address] any warranty or liability for your use of this information.

## 2019-12-26 LAB
CYTOLOGIST CVX/VAG CYTO: NORMAL
CYTOLOGY CVX/VAG DOC CYTO: NORMAL
CYTOLOGY CVX/VAG DOC THIN PREP: NORMAL
DX ICD CODE: NORMAL
LABCORP, 190119: NORMAL
Lab: NORMAL
Lab: NORMAL
OTHER STN SPEC: NORMAL
STAT OF ADQ CVX/VAG CYTO-IMP: NORMAL

## 2019-12-31 NOTE — PROGRESS NOTES
Called pt advised her pt verbalized understanding and was transferred to Elizabeth Grimm to scheduled repeat.

## 2020-01-17 ENCOUNTER — OFFICE VISIT (OUTPATIENT)
Dept: OBGYN CLINIC | Age: 65
End: 2020-01-17

## 2020-01-17 VITALS
HEIGHT: 66 IN | SYSTOLIC BLOOD PRESSURE: 140 MMHG | DIASTOLIC BLOOD PRESSURE: 88 MMHG | WEIGHT: 226.2 LBS | BODY MASS INDEX: 36.35 KG/M2

## 2020-01-17 DIAGNOSIS — R87.615 ENCOUNTER FOR REPEAT PAP SMEAR DUE TO PREVIOUS INSUFF CERVICAL CELLS: Primary | ICD-10-CM

## 2020-01-17 DIAGNOSIS — Z12.4 PAP SMEAR FOR CERVICAL CANCER SCREENING: ICD-10-CM

## 2020-01-17 NOTE — PROGRESS NOTES
60 y.o. for re-pap smear. Insufficient cells on pap smear. Doing well without complaints. Review of symptoms:  Constitutional: negative  Urinary: negative  CV: negative    Neuro: negative  Resp: negative   Psych: negative  GI: negative    Musculoskeletal: negative  GYN: per HPI    Integumentary: negative    Visit Vitals  /88 (BP 1 Location: Left arm, BP Patient Position: Sitting)   Ht 5' 6\" (1.676 m)   Wt 226 lb 3.2 oz (102.6 kg)   BMI 36.51 kg/m²       Physical exam:    Gen: AOx3, NAD  Resp: No respiratory distress  GYN: normal external genitalia. Normal vagina other than atrophy. Normal cervix other than mild stenosis.     A/P  60 y.o. for repap  - pap smear today    RTC PRN or for annual exam

## 2020-01-22 LAB
CYTOLOGIST CVX/VAG CYTO: NORMAL
CYTOLOGY CVX/VAG DOC CYTO: NORMAL
CYTOLOGY CVX/VAG DOC THIN PREP: NORMAL
DX ICD CODE: NORMAL
LABCORP, 190119: NORMAL
Lab: NORMAL
OTHER STN SPEC: NORMAL
STAT OF ADQ CVX/VAG CYTO-IMP: NORMAL

## 2020-02-11 DIAGNOSIS — N95.0 POSTMENOPAUSAL BLEEDING: Primary | ICD-10-CM

## 2020-02-13 ENCOUNTER — HOSPITAL ENCOUNTER (OUTPATIENT)
Dept: PREADMISSION TESTING | Age: 65
Discharge: HOME | End: 2020-02-13
Payer: MEDICARE

## 2020-02-13 VITALS
TEMPERATURE: 98.2 F | HEART RATE: 61 BPM | SYSTOLIC BLOOD PRESSURE: 134 MMHG | DIASTOLIC BLOOD PRESSURE: 83 MMHG | WEIGHT: 227.25 LBS | HEIGHT: 65 IN | RESPIRATION RATE: 18 BRPM | BODY MASS INDEX: 37.86 KG/M2

## 2020-02-13 DIAGNOSIS — N95.0 POSTMENOPAUSAL BLEEDING: ICD-10-CM

## 2020-02-13 LAB
ANION GAP SERPL CALC-SCNC: 3 MMOL/L (ref 5–15)
ATRIAL RATE: 61 BPM
BASOPHILS # BLD: 0 K/UL (ref 0–0.1)
BASOPHILS NFR BLD: 0 % (ref 0–1)
BUN SERPL-MCNC: 9 MG/DL (ref 6–20)
BUN/CREAT SERPL: 8 (ref 12–20)
CALCIUM SERPL-MCNC: 9.1 MG/DL (ref 8.5–10.1)
CALCULATED P AXIS, ECG09: 16 DEGREES
CALCULATED R AXIS, ECG10: -6 DEGREES
CALCULATED T AXIS, ECG11: 69 DEGREES
CHLORIDE SERPL-SCNC: 107 MMOL/L (ref 97–108)
CO2 SERPL-SCNC: 30 MMOL/L (ref 21–32)
CREAT SERPL-MCNC: 1.09 MG/DL (ref 0.55–1.02)
DIAGNOSIS, 93000: NORMAL
DIFFERENTIAL METHOD BLD: NORMAL
EOSINOPHIL # BLD: 0.2 K/UL (ref 0–0.4)
EOSINOPHIL NFR BLD: 2 % (ref 0–7)
ERYTHROCYTE [DISTWIDTH] IN BLOOD BY AUTOMATED COUNT: 14.4 % (ref 11.5–14.5)
GLUCOSE SERPL-MCNC: 90 MG/DL (ref 65–100)
HCT VFR BLD AUTO: 42 % (ref 35–47)
HGB BLD-MCNC: 13.3 G/DL (ref 11.5–16)
IMM GRANULOCYTES # BLD AUTO: 0 K/UL (ref 0–0.04)
IMM GRANULOCYTES NFR BLD AUTO: 0 % (ref 0–0.5)
LYMPHOCYTES # BLD: 2.9 K/UL (ref 0.8–3.5)
LYMPHOCYTES NFR BLD: 40 % (ref 12–49)
MCH RBC QN AUTO: 28.2 PG (ref 26–34)
MCHC RBC AUTO-ENTMCNC: 31.7 G/DL (ref 30–36.5)
MCV RBC AUTO: 89 FL (ref 80–99)
MONOCYTES # BLD: 0.4 K/UL (ref 0–1)
MONOCYTES NFR BLD: 6 % (ref 5–13)
NEUTS SEG # BLD: 3.7 K/UL (ref 1.8–8)
NEUTS SEG NFR BLD: 52 % (ref 32–75)
NRBC # BLD: 0 K/UL (ref 0–0.01)
NRBC BLD-RTO: 0 PER 100 WBC
P-R INTERVAL, ECG05: 128 MS
PLATELET # BLD AUTO: 203 K/UL (ref 150–400)
PMV BLD AUTO: 12.1 FL (ref 8.9–12.9)
POTASSIUM SERPL-SCNC: 4 MMOL/L (ref 3.5–5.1)
Q-T INTERVAL, ECG07: 418 MS
QRS DURATION, ECG06: 76 MS
QTC CALCULATION (BEZET), ECG08: 420 MS
RBC # BLD AUTO: 4.72 M/UL (ref 3.8–5.2)
SODIUM SERPL-SCNC: 140 MMOL/L (ref 136–145)
VENTRICULAR RATE, ECG03: 61 BPM
WBC # BLD AUTO: 7.2 K/UL (ref 3.6–11)

## 2020-02-13 PROCEDURE — 85025 COMPLETE CBC W/AUTO DIFF WBC: CPT

## 2020-02-13 PROCEDURE — 80048 BASIC METABOLIC PNL TOTAL CA: CPT

## 2020-02-13 PROCEDURE — 36415 COLL VENOUS BLD VENIPUNCTURE: CPT

## 2020-02-13 PROCEDURE — 93005 ELECTROCARDIOGRAM TRACING: CPT

## 2020-02-13 RX ORDER — METOPROLOL TARTRATE 25 MG/1
25 TABLET, FILM COATED ORAL
COMMUNITY
End: 2020-04-21

## 2020-02-13 RX ORDER — ACETAMINOPHEN 500 MG
500 TABLET ORAL
COMMUNITY
End: 2020-04-21

## 2020-02-13 NOTE — PERIOP NOTES
Preoperative instructions reviewed with patient. Patient given  2 bottles of CHG soap. Instructions reviewed on use of CHG soap. Patient given SSI infection FAQS sheet,  Patient was given the opportunity to ask questions on the information provided.

## 2020-02-27 NOTE — H&P
Postmenopausal bleeding     60 y.o. with postmenopausal bleeding. She is having bleeding. Bleeding about 3 months ago. Lasted for about a month intermittently. Not heavy.       No pelvic pain, but pain around her waist.     Ultrasound showed thickened endometrium:  History: Postmenopausal bleeding      Ultrasonography of the pelvis was performed transabdominally and endovaginally. Uterus measures 9.5 x 4.7 x 5.2 cm. Endometrial stripe measures 16.5 mm. The  ovaries are obscured transabdominally.     Endovaginally, the uterus measures 9.0 x 4.9 x 4.9 cm. The endometrial stripe  measures 15.9 mm. The left ovary measures 3.7 x 2.4 x 2.2 cm. The right ovary is  obscured by bowel gas. There is normal left ovarian blood flow.     IMPRESSION  IMPRESSION: Thickened endometrium.  Endometrial biopsy recommended.     23X        With regard to the Gardasil vaccine, she is older than the age for which it is FDA approved.     Menstrual status:     Postmenopausal for about 10 years.        Sexual history:     She  has no history on file for sexual activity.     Medical conditions:     Since her last annual GYN exam about three or more years ago, she has not the following changes in her health history: none.              Past Medical History:   Diagnosis Date    Depression      High cholesterol      HTN (hypertension)      Psychiatric disorder      Seasonal allergies              Past Surgical History:   Procedure Laterality Date    CARDIAC SURG PROCEDURE UNLIST        KNEE ARTHROSCP HARV        LIGATE FALLOPIAN TUBE                    Current Outpatient Medications   Medication Sig Dispense Refill    aspirin delayed-release 81 mg tablet Take  by mouth daily.        pravastatin (PRAVACHOL) 40 mg tablet Take 40 mg by mouth nightly.        metoprolol tartrate (LOPRESSOR) 25 mg tablet Take  by mouth two (2) times a day.        gabapentin (NEURONTIN) 300 mg capsule Take 300 mg by mouth three (3) times daily.        lisinopril (PRINIVIL, ZESTRIL) 2.5 mg tablet Take  by mouth daily.        traMADol (ULTRAM) 50 mg tablet Take 50 mg by mouth every six (6) hours as needed for Pain.        ibuprofen (MOTRIN) 400 mg tablet Take 1 Tab by mouth every six (6) hours as needed for Pain. 20 Tab 0    loratadine (CLARITIN) 10 mg tablet Take 10 mg by mouth daily.        OTHER,NON-FORMULARY, HTN medication but can't remember         OTHER,NON-FORMULARY, Medication for cholesterol can't remember         ibuprofen (MOTRIN) 800 mg tablet Take  by mouth every six (6) hours as needed.        oxycodone-acetaminophen (PERCOCET) 5-325 mg per tablet Take 1 Tab by mouth every four (4) hours as needed for Pain for 20 doses. 20 Tab 0    diazepam (VALIUM) 5 mg tablet Take 1 Tab by mouth three (3) times daily as needed for Anxiety (spasm). 20 Tab 0      Allergies: Patient has no known allergies.      Tobacco History:  reports that she quit smoking about 13 years ago. She has never used smokeless tobacco.  Alcohol Abuse:  reports no history of alcohol use.   Drug Abuse:  reports no history of drug use.     Patient feels safe at home.     Family Medical/Cancer History:         Family History   Problem Relation Age of Onset    Hypertension Mother      Heart Disease Mother      Diabetes Mother           Review of Systems - History obtained from the patient  Constitutional: negative for weight loss, fever, night sweats  HEENT: negative for hearing loss, earache, congestion, snoring, sorethroat  CV: negative for chest pain, palpitations, edema  Resp: negative for cough, shortness of breath, wheezing  GI: negative for change in bowel habits, abdominal pain, black or bloody stools  : negative for frequency, dysuria, hematuria, vaginal discharge  MSK: negative for back pain, joint pain, muscle pain  Breast: negative for breast lumps, nipple discharge, galactorrhea  Skin :negative for itching, rash, hives  Neuro: negative for dizziness, headache, confusion, weakness  Psych: negative for anxiety, depression, change in mood  Heme/lymph: negative for bleeding, bruising, pallor     Physical Exam (from 12/19/19 visit)     Constitutional  · Appearance: well-nourished, well developed, alert, in no acute distress     HENT  · Head and Face: appears normal     Neck  · Inspection/Palpation: normal appearance, no masses or tenderness  · Lymph Nodes: no lymphadenopathy present  · Thyroid: gland size normal, nontender, no nodules or masses present on palpation     Chest  · Respiratory Effort: breathing unlabored  · Auscultation: normal breath sounds     Cardiovascular  · Heart:  · Auscultation: regular rate and rhythm without murmur     Breasts  · Inspection of Breasts: breasts symmetrical, no skin changes, no discharge present, nipple appearance normal, no skin retraction present  · Palpation of Breasts and Axillae: no masses present on palpation, no breast tenderness  · Axillary Lymph Nodes: no lymphadenopathy present     Gastrointestinal  · Abdominal Examination: abdomen non-tender to palpation, normal bowel sounds, no masses present  · Liver and spleen: no hepatomegaly present, spleen not palpable  · Hernias: no hernias identified     Genitourinary  · External Genitalia: normal appearance for age, no discharge present, no tenderness present, no inflammatory lesions present, no masses present, no atrophy present  · Vagina: normal vaginal vault without central or paravaginal defects, no discharge present, no inflammatory lesions present, no masses present  · Bladder: non-tender to palpation  · Urethra: appears normal  · Cervix: normal              · Uterus: normal size, shape and consistency  · Adnexa: no adnexal tenderness present, no adnexal masses present  · Perineum: perineum within normal limits, no evidence of trauma, no rashes or skin lesions present  · Anus: anus within normal limits, no hemorrhoids present  · Inguinal Lymph Nodes: no lymphadenopathy present     Skin  · General Inspection: no rash, no lesions identified     Neurologic/Psychiatric  · Mental Status:  · Orientation: grossly oriented to person, place and time  · Mood and Affect: mood normal, affect appropriate     Assessment:  60 y.o. with postmenopausal bleeding.     Plan:  - will proceed with Hysteroscopy, D&C, Myosure polypectomy. Risks and benefits of surgery reviewed. This includes pain, bleeding, infection, and risk of damage to surrounding organs. All questions answered.   Consent signed.

## 2020-02-28 ENCOUNTER — HOSPITAL ENCOUNTER (OUTPATIENT)
Age: 65
Setting detail: OUTPATIENT SURGERY
Discharge: HOME | End: 2020-02-28
Attending: OBSTETRICS & GYNECOLOGY | Admitting: OBSTETRICS & GYNECOLOGY
Payer: MEDICARE

## 2020-02-28 ENCOUNTER — ANESTHESIA EVENT (OUTPATIENT)
Dept: SURGERY | Age: 65
End: 2020-02-28
Payer: MEDICARE

## 2020-02-28 ENCOUNTER — ANESTHESIA (OUTPATIENT)
Dept: SURGERY | Age: 65
End: 2020-02-28
Payer: MEDICARE

## 2020-02-28 VITALS
SYSTOLIC BLOOD PRESSURE: 142 MMHG | BODY MASS INDEX: 37.82 KG/M2 | WEIGHT: 227 LBS | RESPIRATION RATE: 16 BRPM | DIASTOLIC BLOOD PRESSURE: 86 MMHG | TEMPERATURE: 98.1 F | OXYGEN SATURATION: 97 % | HEIGHT: 65 IN | HEART RATE: 83 BPM

## 2020-02-28 DIAGNOSIS — N95.0 POSTMENOPAUSAL BLEEDING: ICD-10-CM

## 2020-02-28 PROCEDURE — 77030018836 HC SOL IRR NACL ICUM -A: Performed by: OBSTETRICS & GYNECOLOGY

## 2020-02-28 PROCEDURE — 77030041423 HC SYST FLUID MNGMT FLUENT HOLO -D: Performed by: OBSTETRICS & GYNECOLOGY

## 2020-02-28 PROCEDURE — 77030008684 HC TU ET CUF COVD -B: Performed by: ANESTHESIOLOGY

## 2020-02-28 PROCEDURE — 77030026236 HC DEV TISS RMVL MYOSUR HOLO -G1: Performed by: OBSTETRICS & GYNECOLOGY

## 2020-02-28 PROCEDURE — 76010000149 HC OR TIME 1 TO 1.5 HR: Performed by: OBSTETRICS & GYNECOLOGY

## 2020-02-28 PROCEDURE — 77030026438 HC STYL ET INTUB CARD -A: Performed by: ANESTHESIOLOGY

## 2020-02-28 PROCEDURE — 74011250636 HC RX REV CODE- 250/636: Performed by: NURSE ANESTHETIST, CERTIFIED REGISTERED

## 2020-02-28 PROCEDURE — 76060000033 HC ANESTHESIA 1 TO 1.5 HR: Performed by: OBSTETRICS & GYNECOLOGY

## 2020-02-28 PROCEDURE — 77030019905 HC CATH URETH INTMIT MDII -A: Performed by: OBSTETRICS & GYNECOLOGY

## 2020-02-28 PROCEDURE — 74011000250 HC RX REV CODE- 250: Performed by: NURSE ANESTHETIST, CERTIFIED REGISTERED

## 2020-02-28 PROCEDURE — 77030040922 HC BLNKT HYPOTHRM STRY -A

## 2020-02-28 PROCEDURE — 77030020268 HC MISC GENERAL SUPPLY: Performed by: OBSTETRICS & GYNECOLOGY

## 2020-02-28 PROCEDURE — 76210000006 HC OR PH I REC 0.5 TO 1 HR: Performed by: OBSTETRICS & GYNECOLOGY

## 2020-02-28 PROCEDURE — 77030040361 HC SLV COMPR DVT MDII -B: Performed by: OBSTETRICS & GYNECOLOGY

## 2020-02-28 PROCEDURE — 76210000020 HC REC RM PH II FIRST 0.5 HR: Performed by: OBSTETRICS & GYNECOLOGY

## 2020-02-28 PROCEDURE — 88305 TISSUE EXAM BY PATHOLOGIST: CPT

## 2020-02-28 RX ORDER — SODIUM CHLORIDE, SODIUM LACTATE, POTASSIUM CHLORIDE, CALCIUM CHLORIDE 600; 310; 30; 20 MG/100ML; MG/100ML; MG/100ML; MG/100ML
100 INJECTION, SOLUTION INTRAVENOUS CONTINUOUS
Status: DISCONTINUED | OUTPATIENT
Start: 2020-02-28 | End: 2020-02-28 | Stop reason: HOSPADM

## 2020-02-28 RX ORDER — MIDAZOLAM HYDROCHLORIDE 1 MG/ML
INJECTION, SOLUTION INTRAMUSCULAR; INTRAVENOUS AS NEEDED
Status: DISCONTINUED | OUTPATIENT
Start: 2020-02-28 | End: 2020-02-28 | Stop reason: HOSPADM

## 2020-02-28 RX ORDER — IBUPROFEN 600 MG/1
600 TABLET ORAL
Qty: 120 TAB | Refills: 1 | Status: SHIPPED | OUTPATIENT
Start: 2020-02-28 | End: 2020-04-16

## 2020-02-28 RX ORDER — SODIUM CHLORIDE 0.9 % (FLUSH) 0.9 %
5-40 SYRINGE (ML) INJECTION EVERY 8 HOURS
Status: DISCONTINUED | OUTPATIENT
Start: 2020-02-28 | End: 2020-02-28 | Stop reason: HOSPADM

## 2020-02-28 RX ORDER — FENTANYL CITRATE 50 UG/ML
INJECTION, SOLUTION INTRAMUSCULAR; INTRAVENOUS AS NEEDED
Status: DISCONTINUED | OUTPATIENT
Start: 2020-02-28 | End: 2020-02-28 | Stop reason: HOSPADM

## 2020-02-28 RX ORDER — LIDOCAINE HYDROCHLORIDE 20 MG/ML
INJECTION, SOLUTION EPIDURAL; INFILTRATION; INTRACAUDAL; PERINEURAL AS NEEDED
Status: DISCONTINUED | OUTPATIENT
Start: 2020-02-28 | End: 2020-02-28 | Stop reason: HOSPADM

## 2020-02-28 RX ORDER — DEXAMETHASONE SODIUM PHOSPHATE 4 MG/ML
INJECTION, SOLUTION INTRA-ARTICULAR; INTRALESIONAL; INTRAMUSCULAR; INTRAVENOUS; SOFT TISSUE AS NEEDED
Status: DISCONTINUED | OUTPATIENT
Start: 2020-02-28 | End: 2020-02-28 | Stop reason: HOSPADM

## 2020-02-28 RX ORDER — MIDAZOLAM HYDROCHLORIDE 1 MG/ML
1 INJECTION, SOLUTION INTRAMUSCULAR; INTRAVENOUS AS NEEDED
Status: DISCONTINUED | OUTPATIENT
Start: 2020-02-28 | End: 2020-02-28 | Stop reason: HOSPADM

## 2020-02-28 RX ORDER — SUCCINYLCHOLINE CHLORIDE 20 MG/ML
INJECTION INTRAMUSCULAR; INTRAVENOUS AS NEEDED
Status: DISCONTINUED | OUTPATIENT
Start: 2020-02-28 | End: 2020-02-28 | Stop reason: HOSPADM

## 2020-02-28 RX ORDER — PROPOFOL 10 MG/ML
INJECTION, EMULSION INTRAVENOUS AS NEEDED
Status: DISCONTINUED | OUTPATIENT
Start: 2020-02-28 | End: 2020-02-28 | Stop reason: HOSPADM

## 2020-02-28 RX ORDER — ONDANSETRON 2 MG/ML
INJECTION INTRAMUSCULAR; INTRAVENOUS AS NEEDED
Status: DISCONTINUED | OUTPATIENT
Start: 2020-02-28 | End: 2020-02-28 | Stop reason: HOSPADM

## 2020-02-28 RX ORDER — SODIUM CHLORIDE 0.9 % (FLUSH) 0.9 %
5-40 SYRINGE (ML) INJECTION AS NEEDED
Status: DISCONTINUED | OUTPATIENT
Start: 2020-02-28 | End: 2020-02-28 | Stop reason: HOSPADM

## 2020-02-28 RX ORDER — MIDAZOLAM HYDROCHLORIDE 1 MG/ML
0.5 INJECTION, SOLUTION INTRAMUSCULAR; INTRAVENOUS
Status: DISCONTINUED | OUTPATIENT
Start: 2020-02-28 | End: 2020-02-28 | Stop reason: HOSPADM

## 2020-02-28 RX ORDER — SODIUM CHLORIDE, SODIUM LACTATE, POTASSIUM CHLORIDE, CALCIUM CHLORIDE 600; 310; 30; 20 MG/100ML; MG/100ML; MG/100ML; MG/100ML
INJECTION, SOLUTION INTRAVENOUS
Status: DISCONTINUED | OUTPATIENT
Start: 2020-02-28 | End: 2020-02-28 | Stop reason: HOSPADM

## 2020-02-28 RX ORDER — DIPHENHYDRAMINE HYDROCHLORIDE 50 MG/ML
12.5 INJECTION, SOLUTION INTRAMUSCULAR; INTRAVENOUS AS NEEDED
Status: DISCONTINUED | OUTPATIENT
Start: 2020-02-28 | End: 2020-02-28 | Stop reason: HOSPADM

## 2020-02-28 RX ORDER — KETOROLAC TROMETHAMINE 30 MG/ML
INJECTION, SOLUTION INTRAMUSCULAR; INTRAVENOUS AS NEEDED
Status: DISCONTINUED | OUTPATIENT
Start: 2020-02-28 | End: 2020-02-28 | Stop reason: HOSPADM

## 2020-02-28 RX ORDER — ROPIVACAINE HYDROCHLORIDE 5 MG/ML
150 INJECTION, SOLUTION EPIDURAL; INFILTRATION; PERINEURAL AS NEEDED
Status: DISCONTINUED | OUTPATIENT
Start: 2020-02-28 | End: 2020-02-28 | Stop reason: HOSPADM

## 2020-02-28 RX ORDER — ROCURONIUM BROMIDE 10 MG/ML
INJECTION, SOLUTION INTRAVENOUS AS NEEDED
Status: DISCONTINUED | OUTPATIENT
Start: 2020-02-28 | End: 2020-02-28 | Stop reason: HOSPADM

## 2020-02-28 RX ORDER — FENTANYL CITRATE 50 UG/ML
50 INJECTION, SOLUTION INTRAMUSCULAR; INTRAVENOUS AS NEEDED
Status: DISCONTINUED | OUTPATIENT
Start: 2020-02-28 | End: 2020-02-28 | Stop reason: HOSPADM

## 2020-02-28 RX ORDER — LIDOCAINE HYDROCHLORIDE 10 MG/ML
0.1 INJECTION, SOLUTION EPIDURAL; INFILTRATION; INTRACAUDAL; PERINEURAL AS NEEDED
Status: DISCONTINUED | OUTPATIENT
Start: 2020-02-28 | End: 2020-02-28 | Stop reason: HOSPADM

## 2020-02-28 RX ORDER — FENTANYL CITRATE 50 UG/ML
25 INJECTION, SOLUTION INTRAMUSCULAR; INTRAVENOUS
Status: DISCONTINUED | OUTPATIENT
Start: 2020-02-28 | End: 2020-02-28 | Stop reason: HOSPADM

## 2020-02-28 RX ORDER — HYDROMORPHONE HYDROCHLORIDE 1 MG/ML
0.2 INJECTION, SOLUTION INTRAMUSCULAR; INTRAVENOUS; SUBCUTANEOUS
Status: DISCONTINUED | OUTPATIENT
Start: 2020-02-28 | End: 2020-02-28 | Stop reason: HOSPADM

## 2020-02-28 RX ORDER — GLYCOPYRROLATE 0.2 MG/ML
INJECTION INTRAMUSCULAR; INTRAVENOUS AS NEEDED
Status: DISCONTINUED | OUTPATIENT
Start: 2020-02-28 | End: 2020-02-28 | Stop reason: HOSPADM

## 2020-02-28 RX ORDER — ACETAMINOPHEN 10 MG/ML
INJECTION, SOLUTION INTRAVENOUS AS NEEDED
Status: DISCONTINUED | OUTPATIENT
Start: 2020-02-28 | End: 2020-02-28 | Stop reason: HOSPADM

## 2020-02-28 RX ADMIN — LIDOCAINE HYDROCHLORIDE 60 MG: 20 INJECTION, SOLUTION EPIDURAL; INFILTRATION; INTRACAUDAL; PERINEURAL at 10:39

## 2020-02-28 RX ADMIN — FENTANYL CITRATE 50 MCG: 50 INJECTION, SOLUTION INTRAMUSCULAR; INTRAVENOUS at 10:49

## 2020-02-28 RX ADMIN — ACETAMINOPHEN 1000 MG: 10 INJECTION, SOLUTION INTRAVENOUS at 10:52

## 2020-02-28 RX ADMIN — KETOROLAC TROMETHAMINE 30 MG: 30 INJECTION, SOLUTION INTRAMUSCULAR; INTRAVENOUS at 11:14

## 2020-02-28 RX ADMIN — SODIUM CHLORIDE, POTASSIUM CHLORIDE, SODIUM LACTATE AND CALCIUM CHLORIDE: 600; 310; 30; 20 INJECTION, SOLUTION INTRAVENOUS at 10:27

## 2020-02-28 RX ADMIN — PROPOFOL 50 MG: 10 INJECTION, EMULSION INTRAVENOUS at 10:49

## 2020-02-28 RX ADMIN — ONDANSETRON HYDROCHLORIDE 4 MG: 2 INJECTION, SOLUTION INTRAMUSCULAR; INTRAVENOUS at 11:06

## 2020-02-28 RX ADMIN — FENTANYL CITRATE 50 MCG: 50 INJECTION, SOLUTION INTRAMUSCULAR; INTRAVENOUS at 10:39

## 2020-02-28 RX ADMIN — SODIUM CHLORIDE, POTASSIUM CHLORIDE, SODIUM LACTATE AND CALCIUM CHLORIDE: 600; 310; 30; 20 INJECTION, SOLUTION INTRAVENOUS at 11:13

## 2020-02-28 RX ADMIN — MIDAZOLAM 2 MG: 1 INJECTION INTRAMUSCULAR; INTRAVENOUS at 10:27

## 2020-02-28 RX ADMIN — PROPOFOL 150 MG: 10 INJECTION, EMULSION INTRAVENOUS at 10:39

## 2020-02-28 RX ADMIN — MEPERIDINE HYDROCHLORIDE 25 MG: 50 INJECTION INTRAMUSCULAR; INTRAVENOUS; SUBCUTANEOUS at 10:46

## 2020-02-28 RX ADMIN — DEXAMETHASONE SODIUM PHOSPHATE 8 MG: 4 INJECTION, SOLUTION INTRAMUSCULAR; INTRAVENOUS at 10:52

## 2020-02-28 RX ADMIN — GLYCOPYRROLATE 0.2 MG: 0.2 INJECTION, SOLUTION INTRAMUSCULAR; INTRAVENOUS at 10:37

## 2020-02-28 RX ADMIN — SUCCINYLCHOLINE CHLORIDE 160 MG: 20 INJECTION, SOLUTION INTRAMUSCULAR; INTRAVENOUS at 10:39

## 2020-02-28 RX ADMIN — ROCURONIUM BROMIDE 5 MG: 10 SOLUTION INTRAVENOUS at 10:39

## 2020-02-28 NOTE — BRIEF OP NOTE
BRIEF OPERATIVE NOTE    Date of Procedure: 2/28/2020   Preoperative Diagnosis: POSTMENOPAUSAL BLEEDING  Postoperative Diagnosis: POSTMENOPAUSAL BLEEDING    Procedure(s): HYSTEROSCOPY/DILATATION AND CURETTAGE/POLYPECTOMY WITH MYSOSURE/ENDOMETRIAL CURRETTINGS  Surgeon(s) and Role:     * Louis Saab MD - Primary         Surgical Assistant: none    Surgical Staff:  Circ-1: Mari Amaya RN  Circ-Relief: Luisana Yip RN  Scrub Tech-1: Kacie Simmons  Scrub RN-2: Colt Munguia  Event Time In Time Out   Incision Start 1052    Incision Close 1108      Anesthesia: General   Estimated Blood Loss: 25 ml  Specimens:   ID Type Source Tests Collected by Time Destination   1 : ENDOMETRIAL CURRETTINGS AND POLYP [facility] Chester Khan MD 2/28/2020 1101 Pathology      Findings: On exam under anesthesia, slightly enlarged, mobile uterus. On hysteroscopy, 3 distinct polyps, one posterior fundal, one anterior lower uterine segment and one on the left sided, along with one cevical polyp, posterior midcervical canal.  All polyps around 1 cm in greatest extent.    Complications: none  Implants: * No implants in log *

## 2020-02-28 NOTE — DISCHARGE INSTRUCTIONS
Patient Education        Operative Hysteroscopy: What to Expect at Home  Your Recovery    An operative hysteroscopy is a procedure to find and treat problems with your uterus. It may have been done to remove growths from the uterus. Or it may have been done to treat fertility problems or abnormal bleeding. You may have cramps and vaginal bleeding for several days. If the doctor filled your uterus with air during the procedure, you may have gas pains, a feeling of fullness in your belly, or shoulder pain. These symptoms usually go away in 1 or 2 days. If the doctor filled your uterus with liquid during the procedure, you may have watery vaginal discharge for a few days. Many women are able to return to work on the day after the procedure. But it depends on what was done during the procedure and the type of work you do. This care sheet gives you a general idea about how long it will take for you to recover. But each person recovers at a different pace. Follow the steps below to get better as quickly as possible. How can you care for yourself at home? Activity    · Rest when you feel tired. Getting enough sleep will help you recover.     · Most women are able to return to work on the day after the procedure.     · You may shower and take baths as usual.     · Ask your doctor when it is okay for you to have sex.     · Talk about birth control with your doctor. Do not try to become pregnant until your doctor says it is okay. Diet    · You can eat your normal diet. If your stomach is upset, try bland, low-fat foods like plain rice, broiled chicken, toast, and yogurt.     · You may notice that your bowel movements are not regular right after the procedure. This is common. Try to avoid constipation and straining with bowel movements. You may want to take a fiber supplement every day. If you have not had a bowel movement after a couple of days, ask your doctor about taking a mild laxative.    Medicines    · Your doctor will tell you if and when you can restart your medicines. He or she will also give you instructions about taking any new medicines.     · If you take blood thinners, such as warfarin (Coumadin), clopidogrel (Plavix), or aspirin, be sure to talk to your doctor. He or she will tell you if and when to start taking those medicines again. Make sure that you understand exactly what your doctor wants you to do.     · Be safe with medicines. Take pain medicines exactly as directed. ? If the doctor gave you a prescription medicine for pain, take it as prescribed. ? If you are not taking a prescription pain medicine, ask your doctor if you can take an over-the-counter medicine. ? Do not take two or more pain medicines at the same time unless the doctor told you to. Many pain medicines have acetaminophen, which is Tylenol. Too much acetaminophen (Tylenol) can be harmful.     · If you think your pain medicine is making you sick to your stomach:  ? Take your medicine after meals (unless your doctor has told you not to). ? Ask your doctor for a different pain medicine.     · If your doctor prescribed antibiotics, take them as directed. Do not stop taking them just because you feel better. You need to take the full course of antibiotics. Other instructions    · You may have some light vaginal bleeding. Wear sanitary pads if needed. Do not douche or use tampons until your doctor says it is okay.     · You may want to use a heating pad on your belly to help with pain. Use a low heat setting. Follow-up care is a key part of your treatment and safety. Be sure to make and go to all appointments, and call your doctor if you are having problems. It's also a good idea to know your test results and keep a list of the medicines you take. When should you call for help? Call 911 anytime you think you may need emergency care.  For example, call if:    · You pass out (lose consciousness).     · You have chest pain, are short of breath, or cough up blood.    Call your doctor now or seek immediate medical care if:    · You have bright red vaginal bleeding that soaks one or more pads in an hour, or you have large clots.     · You have vaginal discharge that has increased in amount or smells bad.     · You are sick to your stomach or cannot drink fluids.     · You have pain that does not get better after you take pain medicine.     · You have signs of infection, such as:  ? Increased pain, swelling, warmth, or redness. ? A fever.     · You cannot pass stools or gas.     · You have signs of a blood clot in your leg (called a deep vein thrombosis), such as:  ? Pain in your calf, back of the knee, thigh, or groin. ? Redness and swelling in your leg.    Watch closely for changes in your health, and be sure to contact your doctor if you have any problems. Where can you learn more? Go to http://alvina-lizbeth.info/. Enter K675 in the search box to learn more about \"Operative Hysteroscopy: What to Expect at Home. \"  Current as of: February 19, 2019  Content Version: 12.2  © 3847-7352 [employer]. Care instructions adapted under license by [employer] (which disclaims liability or warranty for this information). If you have questions about a medical condition or this instruction, always ask your healthcare professional. [address] any warranty or liability for your use of this information. ______________________________________________________________________    Anesthesia Discharge Instructions    After general anesthesia or intervenous sedation, for 24 hours or while taking prescription Narcotics:  · Limit your activities  · Do not drive or operate hazardous machinery  · If you have not urinated within 8 hours after discharge, please contact your surgeon on call.   · Do not make important personal or business decisions  · Do not drink alcoholic beverages    Report the following to your surgeon:  · Excessive pain, swelling, redness or odor of or around the surgical area  · Temperature over 100.5 degrees  · Nausea and vomiting lasting longer than 4 hours or if unable to take medication  · Any signs of decreased circulation or nerve impairment to extremity:  Change in color, persistent numbness, tingling, coldness or increased pain.   · Any questions

## 2020-02-28 NOTE — OP NOTES
Preoperative diagnosis: endometrial polyp, postmenopausal bleeding  Postoperative diagnosis: same    Procedure:  Hysteroscopy, D&C, Myosure polypectomy    Anesthesia: general    Assistant surgeon: none    Implants: none    Complications: none    Estimated blood loss: 25 ml  IV fluid: 1000 ml  Urine output: 50 ml  Fluid deficit: 125 ml    Specimen: endometrial curettings and polyp    Findings: On exam under anesthesia, slightly enlarged, mobile uterus. On hysteroscopy, 3 distinct polyps, one posterior fundal, one anterior lower uterine segment and one on the left sided, along with one cevical polyp, posterior midcervical canal.  All polyps around 1 cm in greatest extent. Procedure:    Patient was taken to the operating room and placed on the operating room table. She was then placed under general anesthesia. She was placed in lithotomy position with her legs in Yellofin stirrups. An exam under anesthesia was performed with the above findings. She was then prepped and draped in a sterile fashion. A time out was performed. Patient was straight catheterized for urine. A sterile speculum was then placed in her vagina and the cervix was visualized. It was then grasped on the anterior lip with a single tooth tenaculum. Uterus was then sounded to 11cm. Cervical os was then serially dilated using Gibbs dilators to 6 mm. A hysteroscope was then inserted into the endometrial cavity with the above findings. Myosure device was then inserted through the hysteroscope, and all polypoid tissue was removed. Hysteroscope and Myosure were then removed from the uterus. The uterus was then sharply curetted until a gritty texture was noted throughout. Tenaculum was then removed from the anterior lip of the cervix. Hemostasis was noted to be excellent. Speculum was then removed from the vagina .     Patient was taken out of lithotomy position, taken out of anesthesia, and taken to the recovery room in stable condition. She tolerated the procedure well, and there were no complications.

## 2020-02-28 NOTE — ROUTINE PROCESS
Patient: Noah Myles MRN: [id number]  SSN: [id number] YOB: 1955  Age: 60 y.o. Sex: female Patient is status post Procedure(s): HYSTEROSCOPY/DILATATION AND CURETTAGE/POLYPECTOMY WITH MYSOSURE/ENDOMETRIAL CURRETTINGS. Surgeon(s) and Role: Kiah Welsh, MD - Primary Local/Dose/Irrigation:  * Airway - Endotracheal Tube 02/28/20 Oral (Active) Dressing/Packing:  Wound Vagina-Dressing Type: Sunshine-pad (02/28/20 1110) Splint/Cast:  ] Other:  *

## 2020-02-28 NOTE — DISCHARGE SUMMARY
Discharge Summary     Name: Saira Bocanegra MRN: [id number]  SSN: [id number]    YOB: 1955  Age: 60 y.o. Sex: female      Allergies: Patient has no known allergies. Admit Date: 2/28/2020    Discharge Date: 2/28/2020      Admitting Physician: Nisreen Krause MD     * Admission Diagnoses: Postmenopausal bleeding, endometrial polyp    * Discharge Diagnoses:   Hospital Problems as of 2/28/2020 Date Reviewed: 2/28/2020    None           * Procedures: Hysteroscopy, D&C, Myosure polypectomy    * Discharge Condition: [facility] Course: Normal hospital course for this procedure. Significant Diagnostic Studies: No results found for this or any previous visit (from the past 24 hour(s)). * Disposition: Home    Discharge Medications:   Current Discharge Medication List      START taking these medications    Details   ibuprofen (MOTRIN) 600 mg tablet Take 1 Tab by mouth every six (6) hours as needed for Pain. Qty: 120 Tab, Refills: 1         CONTINUE these medications which have NOT CHANGED    Details   metoprolol tartrate (LOPRESSOR) 25 mg tablet Take 25 mg by mouth nightly. pravastatin (PRAVACHOL) 40 mg tablet Take 40 mg by mouth nightly.      gabapentin (NEURONTIN) 300 mg capsule Take 300 mg by mouth three (3) times daily. lisinopril (PRINIVIL, ZESTRIL) 2.5 mg tablet Take 5 mg by mouth every morning. traMADol (ULTRAM) 50 mg tablet Take 50 mg by mouth every six (6) hours as needed for Pain. acetaminophen (TYLENOL EXTRA STRENGTH) 500 mg tablet Take 500 mg by mouth every six (6) hours as needed for Pain. aspirin delayed-release 81 mg tablet Take  by mouth every morning. * Follow-up Care/Patient Instructions: Activity: No sex, douching, or tampons for 2 weeks or as directed by your physician.   Diet: Resume pre-hospital diet  Wound Care: None needed    Follow-up Information     Follow up With Specialties Details Why Contact Info    Chris Dorantes MD [facility] [address]  [phone]      Francois Tobin MD Obstetrics & Gynecology Schedule an appointment as soon as possible for a visit in 2 weeks  [address]

## 2020-02-28 NOTE — ANESTHESIA PREPROCEDURE EVALUATION
Relevant Problems   No relevant active problems       Anesthetic History   No history of anesthetic complications            Review of Systems / Medical History  Patient summary reviewed, nursing notes reviewed and pertinent labs reviewed    Pulmonary  Within defined limits      Sleep apnea           Neuro/Psych   Within defined limits           Cardiovascular  Within defined limits  Hypertension                   GI/Hepatic/Renal  Within defined limits              Endo/Other  Within defined limits      Morbid obesity and arthritis     Other Findings              Physical Exam    Airway  Mallampati: II  TM Distance: > 6 cm  Neck ROM: normal range of motion   Mouth opening: Normal     Cardiovascular  Regular rate and rhythm,  S1 and S2 normal,  no murmur, click, rub, or gallop             Dental  No notable dental hx       Pulmonary  Breath sounds clear to auscultation               Abdominal  GI exam deferred       Other Findings            Anesthetic Plan    ASA: 2  Anesthesia type: general          Induction: Intravenous  Anesthetic plan and risks discussed with: Patient

## 2020-02-28 NOTE — INTERVAL H&P NOTE
H&P Update: 
Wojciech Brooke was seen and examined. History and physical has been reviewed. The patient has been examined.  There have been no significant clinical changes since the completion of the originally dated History and Physical.

## 2020-02-28 NOTE — ANESTHESIA POSTPROCEDURE EVALUATION
Procedure(s): HYSTEROSCOPY/DILATATION AND CURETTAGE/POLYPECTOMY WITH MYSOSURE/ENDOMETRIAL CURRETTINGS. general    Anesthesia Post Evaluation      Multimodal analgesia: multimodal analgesia used between 6 hours prior to anesthesia start to PACU discharge  Patient location during evaluation: bedside  Patient participation: waiting for patient participation  Level of consciousness: awake  Pain management: adequate  Airway patency: patent  Anesthetic complications: no  Cardiovascular status: acceptable  Respiratory status: unassisted  Hydration status: acceptable  Comments: Post-Anesthesia Evaluation and Assessment    I have evaluated the patient and they are ready for PACU discharge. Patient: Wander Galdamez MRN: [id number]  SSN: [id number]   YOB: 1955  Age: 60 y.o. Sex: female      Cardiovascular Function/Vital Signs  /78   Pulse 87   Temp 36.7 °C (98.1 °F)   Resp 14   Ht 5' 4.5\" (1.638 m)   Wt 103 kg (227 lb)   SpO2 95%   BMI 38.36 kg/m²     Patient is status post General anesthesia for Procedure(s): HYSTEROSCOPY/DILATATION AND CURETTAGE/POLYPECTOMY WITH MYSOSURE/ENDOMETRIAL CURRETTINGS. Nausea/Vomiting: None    Postoperative hydration reviewed and adequate. Pain:  Pain Scale 1: Numeric (0 - 10) (02/28/20 1147)  Pain Intensity 1: 0(denies pain) (02/28/20 1147)   Managed    Neurological Status:   Neuro (WDL): Exceptions to WDL (02/28/20 1147)  Neuro  Neurologic State: Anesthetized (02/28/20 1147)   At baseline    Mental Status, Level of Consciousness: Alert and  oriented to person, place, and time    Pulmonary Status:   O2 Device: Nasal cannula (02/28/20 1147)   Adequate oxygenation and airway patent    Complications related to anesthesia: None    Post-anesthesia assessment completed.  No concerns    Signed By: Ria Dorsey MD    February 28, 2020                   Vitals Value Taken Time   /84 2/28/2020 12:00 PM   Temp 36.7 °C (98.1 °F) 2/28/2020 11:44 AM   Pulse 85 2/28/2020 12:08 PM   Resp 12 2/28/2020 12:08 PM   SpO2 95 % 2/28/2020 12:08 PM   Vitals shown include unvalidated device data.

## 2020-03-09 NOTE — PROGRESS NOTES
Please make sure that patient follows up for her postop exam (should be this week or next). Thank you.

## 2020-04-16 RX ORDER — IBUPROFEN 600 MG/1
TABLET ORAL
Qty: 120 TAB | Refills: 1 | Status: SHIPPED | OUTPATIENT
Start: 2020-04-16

## 2020-04-20 ENCOUNTER — TELEPHONE (OUTPATIENT)
Dept: OBGYN CLINIC | Age: 65
End: 2020-04-20

## 2020-04-20 NOTE — TELEPHONE ENCOUNTER
Pt was a pt of DR. Santizo and had hysteroscopy D&C on 2/28. She was cancelled for her post op check due to covid and she is still experiencing post menopausal bleeding. She does not have mychart. Would you want to see her in the office for a face to face visit. Please advise.

## 2020-04-21 ENCOUNTER — OFFICE VISIT (OUTPATIENT)
Dept: OBGYN CLINIC | Age: 65
End: 2020-04-21

## 2020-04-21 VITALS
BODY MASS INDEX: 39.33 KG/M2 | SYSTOLIC BLOOD PRESSURE: 140 MMHG | DIASTOLIC BLOOD PRESSURE: 80 MMHG | WEIGHT: 230.38 LBS | HEIGHT: 64 IN

## 2020-04-21 DIAGNOSIS — N95.0 PMB (POSTMENOPAUSAL BLEEDING): Primary | ICD-10-CM

## 2020-04-21 RX ORDER — MEDROXYPROGESTERONE ACETATE 10 MG/1
20 TABLET ORAL DAILY
Qty: 180 TAB | Refills: 0 | Status: SHIPPED | OUTPATIENT
Start: 2020-04-21 | End: 2020-08-18 | Stop reason: SDUPTHER

## 2020-04-21 NOTE — PATIENT INSTRUCTIONS
Vaginal Bleeding in Nonpregnant Women: Care Instructions Your Care Instructions Many women have bleeding or spotting between periods. Lots of things can cause it. You may bleed because of hormone problems, stress, or ovulation. Fibroids and IUDs (intrauterine devices) can also cause bleeding. If your bleeding or spotting is caused by one of these things and is not heavy or doesn't happen often, you probably don't need to worry. But in rare cases, infection, cancer, or other serious conditions can cause bleeding. So you may need more tests to find the cause of your bleeding. The doctor has checked you carefully, but problems can develop later. If you notice any problems or new symptoms, get medical treatment right away. Follow-up care is a key part of your treatment and safety. Be sure to make and go to all appointments, and call your doctor if you are having problems. It's also a good idea to know your test results and keep a list of the medicines you take. How can you care for yourself at home? · Take pain medicines exactly as directed. ? If the doctor gave you a prescription medicine for pain, take it as prescribed. ? If you are not taking a prescription pain medicine, ask your doctor if you can take an over-the-counter medicine. Do not take aspirin, which may make bleeding worse. · If your doctor prescribed birth control pills for your bleeding, take them as directed. · Eat foods that are high in iron and vitamin C. Foods high in iron include red meat, shellfish, eggs, beans, and leafy green vegetables. Foods high in vitamin C include citrus fruits, tomatoes, and broccoli. Ask your doctor if you need to take iron pills or a multivitamin. · Ask your doctor when it is okay to have sex. When should you call for help? Call 911 anytime you think you may need emergency care. For example, call if: 
  · You passed out (lost consciousness).  
 Call your doctor now or seek immediate medical care if:   · You have severe vaginal bleeding.  
  · You are dizzy or lightheaded, or you feel like you may faint.  
  · You have new or worse belly or pelvic pain.  
 Watch closely for changes in your health, and be sure to contact your doctor if: 
  · Your bleeding gets worse.  
  · You think you might be pregnant.  
  · You do not get better as expected. Where can you learn more? Go to http://alvina-lizbeth.info/ Enter M710 in the search box to learn more about \"Vaginal Bleeding in Nonpregnant Women: Care Instructions. \" Current as of: November 7, 2019Content Version: 12.4 © 7618-4369 [employer]wise, Incorporated. Care instructions adapted under license by [employer] (which disclaims liability or warranty for this information). If you have questions about a medical condition or this instruction, always ask your healthcare professional. [address] any warranty or liability for your use of this information.

## 2020-04-21 NOTE — PROGRESS NOTES
Postmenopausal Bleeding      Ms. Melissa Kelly is a 60 y.o. [address] [address] female presenting for evaluation of postmenopausal bleeding. She states she has been bleeding for approximately 2 months. Her current episode of bleeding moderate has lasted 2 months. Patient states the bleeding started 3 days after her Myosure procedure with Dr. Arvilla Krabbe.      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 on recent D&C with previous provider  Modifying factors: rest  Associated symptoms: none    Past Medical History:   Diagnosis Date    Arthritis     OSTEO- GENERALIZED    Depression     High cholesterol     HTN (hypertension)     Morbid obesity ([address]     Psychiatric disorder     Seasonal allergies     Sleep apnea     USE CPAP        Past Surgical History:   Procedure Laterality Date    HX COLONOSCOPY      HX KNEE ARTHROSCOPY Right     HX OOPHORECTOMY      KNEE ARTHROSCP HARV      LIGATE FALLOPIAN TUBE         Family History   Problem Relation Age of Onset    Hypertension Mother     Heart Disease Mother     Diabetes Mother     No Known Problems Father     No Known Problems Sister     Pneumonia Brother     No Known Problems Daughter     No Known Problems Son     Anesth Problems Neg Hx        Social History     Socioeconomic History    Marital status:      Spouse name: Not on file    Number of children: Not on file    Years of education: Not on file    Highest education level: Not on file   Occupational History    Not on file   Social Needs    Financial resource strain: Not on file    Food insecurity     Worry: Not on file     Inability: Not on file    Transportation needs     Medical: Not on file     Non-medical: Not on file   Tobacco Use    Smoking status: Former Smoker     Last attempt to quit: 2006     Years since quittin.3    Smokeless tobacco: Never Used   Substance and Sexual Activity    Alcohol use: Yes    Drug use: No    Sexual activity: Yes     Partners: Male   Lifestyle    Physical activity     Days per week: Not on file     Minutes per session: Not on file    Stress: Not on file   Relationships    Social connections     Talks on phone: Not on file     Gets together: Not on file     Attends [employer] service: Not on file     Active member of club or organization: Not on file     Attends meetings of clubs or organizations: Not on file     Relationship status: Not on file    Intimate partner violence     Fear of current or ex partner: Not on file     Emotionally abused: Not on file     Physically abused: Not on file     Forced sexual activity: Not on file   Other Topics Concern    Not on file   Social History Narrative    Not on file       Prior to Admission medications    Medication Sig Start Date End Date Taking? Authorizing Provider   ibuprofen (MOTRIN) 600 mg tablet TAKE 1 TABLET BY MOUTH EVERY 6 HOURS AS NEEDED FOR PAIN 4/16/20  Yes Uri Jalloh MD   aspirin delayed-release 81 mg tablet Take  by mouth every morning. Yes Provider, Historical   pravastatin (PRAVACHOL) 40 mg tablet Take 40 mg by mouth nightly. Yes Provider, Historical   gabapentin (NEURONTIN) 300 mg capsule Take 300 mg by mouth three (3) times daily. Yes Provider, Historical   lisinopril (PRINIVIL, ZESTRIL) 2.5 mg tablet Take 5 mg by mouth every morning. Yes Provider, Historical   traMADol (ULTRAM) 50 mg tablet Take 50 mg by mouth every six (6) hours as needed for Pain. Yes Provider, Historical   metoprolol tartrate (LOPRESSOR) 25 mg tablet Take 25 mg by mouth nightly. Provider, Historical   acetaminophen (TYLENOL EXTRA STRENGTH) 500 mg tablet Take 500 mg by mouth every six (6) hours as needed for Pain.     Provider, Historical        No Known Allergies    Review of Systems - History obtained from the patient  Constitutional: negative for weight loss, fever, night sweats  HEENT: negative for hearing loss, earache, congestion, snoring, sorethroat  CV: negative for chest pain, palpitations, edema  Resp: negative for cough, shortness of breath, wheezing  Breast: negative for breast lumps, nipple discharge, galactorrhea  GI: negative for change in bowel habits, abdominal pain, black or bloody stools  : negative for frequency, dysuria, hematuria, +vaginal bleeding, as per HPI  MSK: negative for back pain, joint pain, muscle pain  Skin: negative for itching, rash, hives  Neuro: negative for dizziness, headache, confusion, weakness  Psych: negative for anxiety, depression, change in mood  Heme/lymph: negative for bleeding, bruising, pallor      Objective:    Visit Vitals  /80 (BP 1 Location: Left arm, BP Patient Position: Sitting)   Ht 5' 4\" (1.626 m)   Wt 230 lb 6 oz (104.5 kg)   BMI 39.54 kg/m²       PHYSICAL EXAMINATION    Constitutional  · Appearance: well-nourished, well developed, alert, in no acute distress    HENT  · Head and Face: appears normal    Neck  · Inspection/Palpation: normal appearance, no masses or tenderness  · Lymph Nodes: no lymphadenopathy present  · Thyroid: gland size normal, nontender, no nodules or masses present on palpation    Chest  · Respiratory Effort: non-labored breathing  · Auscultation: CTAB, normal breath sounds    Cardiovascular  · Heart:  · Auscultation: regular rate and rhythm without murmur  · Extremities: no peripheral edema    Gastrointestinal  · Abdominal Examination: abdomen non-tender to palpation, normal bowel sounds, no masses present  · Liver and spleen: no hepatomegaly present, spleen not palpable  · Hernias: no hernias identified    Skin  · General Inspection: no rash, no lesions identified    Neurologic/Psychiatric  · Mental Status:  · Orientation: grossly oriented to person, place and time  · Mood and Affect: mood normal, affect appropriate    No results found for this or any previous visit (from the past 24 hour(s)). Assessment/Plan:   Emory Ulrich is a 60 y.o. female presenting for evaluation of postmenopausal bleeding in setting of hyperplasia. Discussed options including expectant mgmt v all progesterone options v hysterectomy. She would like 90d trial of Provera now. Discussed benefits of weight loss. Reviewed bleeding precautions and reasons to come into ER (bleeding >1-2 pads/tampons per hour, associated dizziness, SOB, CP, fever, etc). All questions answered.     RTC: prn or sooner for any problems or concerns  Instructions and handouts given to patient    Reji Roberson  4/21/2020  10:07 AM       Signed By: Ivonne Lundberg MD     April 21, 2020

## 2020-04-29 ENCOUNTER — TELEPHONE (OUTPATIENT)
Dept: OBGYN CLINIC | Age: 65
End: 2020-04-29

## 2020-08-18 ENCOUNTER — OFFICE VISIT (OUTPATIENT)
Dept: OBGYN CLINIC | Age: 65
End: 2020-08-18
Payer: MEDICARE

## 2020-08-18 VITALS
SYSTOLIC BLOOD PRESSURE: 140 MMHG | BODY MASS INDEX: 37.98 KG/M2 | WEIGHT: 222.5 LBS | DIASTOLIC BLOOD PRESSURE: 84 MMHG | HEIGHT: 64 IN

## 2020-08-18 DIAGNOSIS — N95.0 PMB (POSTMENOPAUSAL BLEEDING): Primary | ICD-10-CM

## 2020-08-18 PROCEDURE — 58100 BIOPSY OF UTERUS LINING: CPT | Performed by: OBSTETRICS & GYNECOLOGY

## 2020-08-18 PROCEDURE — 99213 OFFICE O/P EST LOW 20 MIN: CPT | Performed by: OBSTETRICS & GYNECOLOGY

## 2020-08-18 RX ORDER — MEDROXYPROGESTERONE ACETATE 10 MG/1
20 TABLET ORAL DAILY
Qty: 180 TAB | Refills: 0 | Status: SHIPPED | OUTPATIENT
Start: 2020-08-18 | End: 2020-11-08

## 2020-08-18 NOTE — PROGRESS NOTES
Patient presents today to discuss medication follow-up. Her postmenopausal bleeding (in setting of complex hyperplasia without atypia) had resolved with progesterone, she recently ran out of progesterone and bleeding returned. Discussed repeat EMB today and to restart progesterone-patient in agreement with plan. Rx sent. Visit time 15min. More than 50% of my face-to-face time was spend on patient counseling. Signed By: Radhames Espinoza MD     2020            EMB Procedure Note    Chart reviewed for the following:  I have reviewed the History, Physical and updated the Allergic reactions for Garcia Rangel. TIME OUT performed immediately prior to start of procedure:  I have performed the following reviews on Garcia Rangel prior to the start of the procedure:          Patient was identified by name and date of birth   Agreement on procedure being performed was verified  Risks and Benefits explained to the patient  Consent was signed and verified     Time: 11:09  Date of procedure: 2020  Procedure performed by:  Christopher Officer, MD  How tolerated by patient: Well  Post Procedural Pain Scale: 2  Comments: None    Endometrial biopsy  Indications:   Garcia Rangel is a 60 y.o. , No LMP recorded. Patient is postmenopausal., who presents for an endometrial biopsy for PMB. After the indications, risks, benefits, and alternatives to performing an endometrial biopsy were explained to the patient, her questions were answered and informed consent was obtained. Procedure: The patient was placed on the table in the dorsal lithotomy position. A bimanual exam showed the uterus to be anterior. The uterus was minmally enlarged. A speculum was placed in the vagina. The cervix was visualized. A tenaculum was placed on the anterior lip of the cervix for traction. It was not necessary to dilate the cervix. A pipelle was passed through the endocervical canal without difficulty.  The uterus was sounded to 7 cm's. A moderate amount of tissue was returned. This tissue was placed in formalin and sent to pathology. It was felt that an adequate sample was obtained. All instruments were removed from the vagina after hemostasis was noted. Post Procedural Status: The patient tolerated the procedure well and she reported mild cramping. The tenaculum and speculum were removed. The patient was observed for 10 minutes after the procedure. She had mild cramping at the time of discharge. There were no complications. The patient was discharged in stable condition. Will call with results.     Niki Dugan     Signed By: Janine Lima MD     August 18, 2020

## 2020-08-18 NOTE — PATIENT INSTRUCTIONS
Pelvic Exam: Care Instructions Your Care Instructions When your doctor examines all of your pelvic organs, it's called a pelvic exam. Two good reasons to have this kind of exam are to check for sexually transmitted infections (STIs) and to get a Pap test. A Pap test is also called a Pap smear. It checks for early changes that can lead to cancer of the cervix. Sometimes a pelvic exam is part of a regular checkup. Your doctor may ask you to avoid vaginal sex, tampons, vaginal medicines, vaginal sprays or powders, and douching for 1 to 2 days before the test. 
Other times, women have this kind of exam at any time of the month. This is because they have pelvic pain, bleeding, or discharge. Or they may have another pelvic problem. Before your exam, it's important to share some information with your doctor. For example, if you are a survivor of rape or sexual abuse, you can talk about any concerns you may have. Your doctor will also want to know if you are pregnant or use birth control. And he or she will want to hear about any problems, surgeries, or procedures you have had in your pelvic area. You will also need to tell your doctor when your last period was. Follow-up care is a key part of your treatment and safety. Be sure to make and go to all appointments, and call your doctor if you are having problems. It's also a good idea to know your test results and keep a list of the medicines you take. How is a pelvic exam done? · During a pelvic exam, you will: ? Take off your clothes below the waist. You will get a paper or cloth cover to put over the lower half of your body. If this is regular checkup, you may undress completely and put on a gown. ? Lie on your back on an exam table. Your feet will be raised above you. Stirrups will support your feet. · The doctor will: ? Ask you to relax your knees. Your knees need to lean out, toward the walls. ? Check the opening of your vagina for sores or swelling. ? Gently put a tool called a speculum into your vagina. It opens the vagina a little bit. You will feel some pressure. But if you are relaxed, it will not hurt. It lets your doctor see inside the vagina. ? Use a small brush, spatula, or swab to get a sample of cells, if you are having a Pap test or culture. The doctor then removes the speculum. ? Put on gloves and put one or two fingers of one hand into your vagina. The other hand goes on your lower belly. This lets your doctor feel your pelvic organs. You will probably feel some pressure. Try to stay relaxed. ? Put one gloved finger into your rectum and one into your vagina, if needed. This can also help check your pelvic organs. This exam takes about 10 minutes. At the end, you will get a washcloth or tissue to clean your vaginal area. You can then get dressed. Why is a pelvic exam done? A pelvic exam may be done: · As part of a woman's regular physical checkup. The exam may include a Pap test. 
· To check for vaginal infection. · To check for sexually transmitted infections, such as chlamydia or herpes. · To help find the cause of abnormal uterine bleeding. · To look for problems like uterine fibroids, ovarian cysts, or uterine prolapse. · To find the cause of pelvic or belly pain. · Before inserting an intrauterine device (IUD) for birth control. · To collect evidence if you've been sexually assaulted. What are the risks of a pelvic exam? 
There is a small chance that the doctor will find something on a pelvic exam that would not have caused a problem. This is called overdiagnosis. It could lead to tests or treatment you don't need. When should you call for help? Watch closely for changes in your health, and be sure to contact your doctor if you have any problems. Where can you learn more? Go to http://alvina-lizbeth.info/ Enter Q919 in the search box to learn more about \"Pelvic Exam: Care Instructions. \" Current as of: November 8, 2019               Content Version: 12.5 © 1093-9689 Healthwise, Incorporated. Care instructions adapted under license by [employer] (which disclaims liability or warranty for this information). If you have questions about a medical condition or this instruction, always ask your healthcare professional. [address] [place] [address] any warranty or liability for your use of this information.

## 2020-08-28 LAB
CPT CODES, 490044: NORMAL
CPT DISCLAIMER: NORMAL
CYTOLOGY SPEC DOC CYTO: NORMAL
DIAGNOSIS SYNOPSIS:: NORMAL
DX ICD CODE: NORMAL
DX ICD CODE: NORMAL
PATH REPORT.GROSS SPEC: NORMAL
PATHOLOGIST NAME: NORMAL
PDF IMAGE, 807507: NORMAL
SPECIMEN SOURCE: NORMAL

## 2020-08-28 NOTE — PROGRESS NOTES
Per Dr. Ayden Vasques- called and spoke with patient. Informed her of lab results. Patient verbalized understanding and had no questions at this time. Patient states she would like to call back and schedule follow up at a later date.

## 2020-11-08 RX ORDER — MEDROXYPROGESTERONE ACETATE 10 MG/1
TABLET ORAL
Qty: 180 TAB | Refills: 0 | Status: SHIPPED | OUTPATIENT
Start: 2020-11-08 | End: 2021-02-03

## 2021-02-03 RX ORDER — MEDROXYPROGESTERONE ACETATE 10 MG/1
TABLET ORAL
Qty: 180 TAB | Refills: 0 | Status: SHIPPED | OUTPATIENT
Start: 2021-02-03 | End: 2021-02-15

## 2021-02-15 RX ORDER — MEDROXYPROGESTERONE ACETATE 10 MG/1
TABLET ORAL
Qty: 180 TAB | Refills: 0 | Status: SHIPPED | OUTPATIENT
Start: 2021-02-15

## 2021-09-14 ENCOUNTER — TRANSCRIBE ORDER (OUTPATIENT)
Dept: SCHEDULING | Age: 66
End: 2021-09-14

## 2021-09-14 DIAGNOSIS — Z12.31 BREAST CANCER SCREENING BY MAMMOGRAM: Primary | ICD-10-CM

## 2021-09-16 ENCOUNTER — OFFICE VISIT (OUTPATIENT)
Dept: OBGYN CLINIC | Age: 66
End: 2021-09-16
Payer: MEDICARE

## 2021-09-16 ENCOUNTER — HOSPITAL ENCOUNTER (OUTPATIENT)
Dept: LAB | Age: 66
Discharge: HOME | End: 2021-09-16

## 2021-09-16 VITALS — BODY MASS INDEX: 37.76 KG/M2 | WEIGHT: 220 LBS

## 2021-09-16 DIAGNOSIS — N95.0 PMB (POSTMENOPAUSAL BLEEDING): Primary | ICD-10-CM

## 2021-09-16 PROCEDURE — 99214 OFFICE O/P EST MOD 30 MIN: CPT | Performed by: OBSTETRICS & GYNECOLOGY

## 2021-09-16 PROCEDURE — 58100 BIOPSY OF UTERUS LINING: CPT | Performed by: OBSTETRICS & GYNECOLOGY

## 2021-09-16 RX ORDER — METOPROLOL TARTRATE 25 MG/1
25 TABLET, FILM COATED ORAL 2 TIMES DAILY
COMMUNITY
Start: 2021-08-12

## 2021-09-16 NOTE — PROGRESS NOTES
Postmenopausal Bleeding      Ms. Charo Yusuf is a 73 y.o. [address] [address] female presenting for evaluation of postmenopausal bleeding. She states she has been bleeding for approximately 3 weeks. She was previously on progesterone for endometrial hyperplasia. Past Medical History:   Diagnosis Date    Arthritis     OSTEO- GENERALIZED    Depression     High cholesterol     HTN (hypertension)     Morbid obesity ([address]     Psychiatric disorder     Seasonal allergies     Sleep apnea     USE CPAP        Past Surgical History:   Procedure Laterality Date    HX COLONOSCOPY      HX KNEE ARTHROSCOPY Right     HX OOPHORECTOMY      KNEE ARTHROSCP HARV      LIGATE FALLOPIAN TUBE         Family History   Problem Relation Age of Onset    Hypertension Mother     Heart Disease Mother     Diabetes Mother     No Known Problems Father     No Known Problems Sister     Pneumonia Brother     No Known Problems Daughter     No Known Problems Son     Anesth Problems Neg Hx        Social History     Socioeconomic History    Marital status:      Spouse name: Not on file    Number of children: Not on file    Years of education: Not on file    Highest education level: Not on file   Occupational History    Not on file   Tobacco Use    Smoking status: Former Smoker     Quit date: 2006     Years since quitting: 15.7    Smokeless tobacco: Never Used   Substance and Sexual Activity    Alcohol use: Yes    Drug use: No    Sexual activity: Yes     Partners: Male   Other Topics Concern    Not on file   Social History Narrative    Not on file     Social Determinants of Health     Financial Resource Strain:     Difficulty of Paying Living Expenses:    Food Insecurity:     Worried About Running Out of Food in the Last Year:     [address] in the Last Year:    Transportation Needs:     Lack of Transportation (Medical):      Lack of Transportation (Non-Medical):    Physical Activity:     Days of Exercise per Week:     Minutes of Exercise per Session:    Stress:     Feeling of Stress :    Social Connections:     Frequency of Communication with Friends and Family:     Frequency of Social Gatherings with Friends and Family:     Attends Sh[employer] Services:     Active Member of Clubs or Organizations:     Attends Club or Organization Meetings:     Marital Status:    Intimate Partner Violence:     Fear of Current or Ex-Partner:     Emotionally Abused:     Physically Abused:     Sexually Abused:        Prior to Admission medications    Medication Sig Start Date End Date Taking? Authorizing Provider   metoprolol tartrate (LOPRESSOR) 25 mg tablet Take 25 mg by mouth two (2) times a day. 8/12/21  Yes Provider, Historical   medroxyPROGESTERone (PROVERA) 10 mg tablet TAKE 2 TABS BY MOUTH DAILY 2/15/21  Yes Andres Chapman MD   ibuprofen (MOTRIN) 600 mg tablet TAKE 1 TABLET BY MOUTH EVERY 6 HOURS AS NEEDED FOR PAIN 4/16/20  Yes Williams Kwok MD   aspirin delayed-release 81 mg tablet Take  by mouth every morning. Yes Provider, Historical   pravastatin (PRAVACHOL) 40 mg tablet Take 40 mg by mouth nightly. Yes Provider, Historical   gabapentin (NEURONTIN) 300 mg capsule Take 300 mg by mouth three (3) times daily. Yes Provider, Historical   lisinopril (PRINIVIL, ZESTRIL) 2.5 mg tablet Take 5 mg by mouth every morning. Yes Provider, Historical   traMADol (ULTRAM) 50 mg tablet Take 50 mg by mouth every six (6) hours as needed for Pain.    Yes Provider, Historical        No Known Allergies      Objective:    Visit Vitals  Wt 220 lb (99.8 kg)   BMI 37.76 kg/m²       PHYSICAL EXAMINATION    Constitutional  · Appearance: well-nourished, well developed, alert, in no acute distress    HENT  · Head and Face: appears normal    Genitourinary  · External Genitalia: normal appearance for age, no discharge present, no tenderness present, no inflammatory lesions present, no masses present, no atrophy present  · Vagina: normal vaginal vault without central or paravaginal defects, no discharge present, no inflammatory lesions present, no masses present, 2 scopettes of blood in vault  · Bladder: non-tender to palpation  · Urethra: appears normal  · Cervix: normal   · Uterus: normal size, shape and consistency, mobile  · Adnexa: no adnexal tenderness present, no adnexal masses present  · Perineum: perineum within normal limits, no evidence of trauma, no rashes or skin lesions present    Skin  · General Inspection: no rash, no lesions identified    Neurologic/Psychiatric  · Mental Status:  · Orientation: grossly oriented to person, place and time  · Mood and Affect: mood normal, affect appropriate    No results found for this or any previous visit (from the past 24 hour(s)). Assessment/Plan:   Nenita Ramirez is a 73 y.o. female presenting for evaluation of postmenopausal bleeding. Discussed possible etiologies of bleeding including atrophy, cervix, vagina, and endometrium. Discussed work-up including exam today, EMB. EMB sent, discussed options for mgmt of persistent PMB. Discussed hysterectomy, she is in agreement, will ref for hysterectomy. Reviewed bleeding precautions and reasons to come into ER (bleeding >1-2 pads/tampons per hour, associated dizziness, SOB, CP, fever, etc). All questions answered. RTC: prn or sooner for any problems or concerns  Instructions and handouts given to patient    Rashida Sanford  9/16/2021  11:09 AM         EMB Procedure Note    Chart reviewed for the following:  I have reviewed the History, Physical and updated the Allergic reactions for Nenita Ramirez.     TIME OUT performed immediately prior to start of procedure:  I have performed the following reviews on Nenita Ramirez prior to the start of the procedure:          Patient was identified by name and date of birth   Agreement on procedure being performed was verified  Risks and Benefits explained to the patient  Consent was signed and verified     Time: 14  Date of procedure: 2021  Procedure performed by:  Dr. Vida Du  How tolerated by patient: Well  Post Procedural Pain Scale: 2  Comments: None    Endometrial biopsy  Indications:   Lu Mohr is a 73 y.o. , No LMP recorded. Patient is postmenopausal., who presents for an endometrial biopsy for PMB. After the indications, risks, benefits, and alternatives to performing an endometrial biopsy were explained to the patient, her questions were answered and informed consent was obtained. Procedure: The patient was placed on the table in the dorsal lithotomy position. A bimanual exam showed the uterus to be anterior. The uterus was minmally enlarged. A speculum was placed in the vagina. The cervix was visualized. A tenaculum was NOT placed on the anterior lip of the cervix for traction. It was not necessary to dilate the cervix. A pipelle was passed through the endocervical canal without difficulty. The uterus was sounded to 9 cm's. A moderate amount of tissue was returned. This tissue was placed in formalin and sent to pathology. It was felt that an adequate sample was obtained. All instruments were removed from the vagina after hemostasis was noted. Post Procedural Status: The patient tolerated the procedure well and she reported mild cramping. The tenaculum and speculum were removed. The patient was observed for 10 minutes after the procedure. She had mild cramping at the time of discharge. There were no complications. The patient was discharged in stable condition. Will call with results.     Chun Roque MD

## 2021-09-28 ENCOUNTER — OFFICE VISIT (OUTPATIENT)
Dept: GYNECOLOGY | Age: 66
End: 2021-09-28
Payer: MEDICARE

## 2021-09-28 VITALS
HEART RATE: 65 BPM | WEIGHT: 221 LBS | SYSTOLIC BLOOD PRESSURE: 155 MMHG | BODY MASS INDEX: 37.73 KG/M2 | DIASTOLIC BLOOD PRESSURE: 97 MMHG | HEIGHT: 64 IN

## 2021-09-28 DIAGNOSIS — N95.0 PMB (POSTMENOPAUSAL BLEEDING): Primary | ICD-10-CM

## 2021-09-28 DIAGNOSIS — N85.01 COMPLEX ENDOMETRIAL HYPERPLASIA WITHOUT ATYPIA: ICD-10-CM

## 2021-09-28 DIAGNOSIS — G47.33 OBSTRUCTIVE SLEEP APNEA SYNDROME: ICD-10-CM

## 2021-09-28 DIAGNOSIS — E66.01 SEVERE OBESITY WITH BODY MASS INDEX (BMI) OF 35.0 TO 39.9 WITH SERIOUS COMORBIDITY (HCC): ICD-10-CM

## 2021-09-28 DIAGNOSIS — I10 PRIMARY HYPERTENSION: ICD-10-CM

## 2021-09-28 PROCEDURE — 99205 OFFICE O/P NEW HI 60 MIN: CPT | Performed by: OBSTETRICS & GYNECOLOGY

## 2021-09-28 NOTE — PROGRESS NOTES
[address] [place] [address] [address], [address] [facility]  P [phone]  F [phone]    Office Note  Patient ID:  Name:  Teressa Patrick  MRN:  [id number]  :  1955/66 y.o. Date:  10/4/2021      HISTORY OF PRESENT ILLNESS:  Ms. Teressa Patrick is a 73 y.o.  postmenopausal female who presents as a new patient from Dr. Karol Wadsworth for postmenopausal bleeding. The patient is a poor historian, and the majority of her history is pieced together through her chart. The patient reports vaginal spotting about 2 months ago. Upon further review she reports having the same problem last year. She underwent a D&C in  with Dr. Shahid Carrillo, which demonstrated \"complex hyperplasia without atypia arisign within endometrial polyp\". She was started on progesterone therapy at that time. She ultimately stopped this therapy at some point, although that is unclear. She subsequently underwent a repeat endometrial biopsy in 2020 with Dr. Karol Wadsworth which was negative for dysplasia. Given the continued vaginal spotting/bleeding, she underwent another EMB with Dr. Karol Wadsworth on 2021 which demonstrated \"extremely scan endometrial tissue in a background of clotted blood\". She was subsequently referred to our office for further management. Continues to report spotting. Denies pelvic or abdominal pain. Denies CP or SOB. Denies urinary symptoms. Pertinent PMH/PSH: HTN, Obesity, complex endometrial hyperplasia without atypia in 2020. Active, no restrictions. Pathology Review:   2021:  * * *FINAL PATHOLOGIC DIAGNOSIS* * *   Endometrium, biopsy:        Extremely scant endometrial tissue in a background of clotted blood   (see comment). * * *Comment* * *   There is insufficient endometrial tissue for evaluation.  Recommend   sampling, as clinically indicated.      2020:  * * *FINAL PATHOLOGIC DIAGNOSIS* * *  Endometrium, curetting:        Complex hyperplasia without atypia arising within endometrial polyp        ROS:  A comprehensive review of systems was negative except for that written in the History of Present Illness. , 10 point ROS    OB/GYN ROS:  Per HPI    ECOG ndGndrndanddndend:nd nd2nd Problem List:  Patient Active Problem List    Diagnosis Date Noted    PMB (postmenopausal bleeding) 10/04/2021    Complex endometrial hyperplasia without atypia 10/04/2021    Sleep apnea     Morbid obesity ([address]     High cholesterol     Depression     Severe obesity with body mass index (BMI) of 35.0 to 39.9 with serious comorbidity ([address] 09/19/2018    HTN (hypertension) 09/19/2018     PMH:  Past Medical History:   Diagnosis Date    Arthritis     OSTEO- GENERALIZED    Depression     High cholesterol     HTN (hypertension)     Morbid obesity ([address]     Psychiatric disorder     Seasonal allergies     Sleep apnea     USE CPAP      PSH:  Past Surgical History:   Procedure Laterality Date    HX COLONOSCOPY      HX KNEE ARTHROSCOPY Right     HX OOPHORECTOMY      KNEE ARTHROSCP HARV      LIGATE FALLOPIAN TUBE        Social History:  Social History     Tobacco Use    Smoking status: Former Smoker     Quit date: 2006     Years since quitting: 15.7    Smokeless tobacco: Never Used   Substance Use Topics    Alcohol use: Yes      Family History:  Family History   Problem Relation Age of Onset    Hypertension Mother     Heart Disease Mother     Diabetes Mother     Breast Cancer Mother     No Known Problems Father     No Known Problems Sister     Pneumonia Brother     No Known Problems Daughter     No Known Problems Son     Anesth Problems Neg Hx       Medications: (reviewed)  Current Outpatient Medications   Medication Sig    metoprolol tartrate (LOPRESSOR) 25 mg tablet Take 25 mg by mouth two (2) times a day.     medroxyPROGESTERone (PROVERA) 10 mg tablet TAKE 2 TABS BY MOUTH DAILY    ibuprofen (MOTRIN) 600 mg tablet TAKE 1 TABLET BY MOUTH EVERY 6 HOURS AS NEEDED FOR PAIN    aspirin delayed-release 81 mg tablet Take  by mouth every morning.  pravastatin (PRAVACHOL) 40 mg tablet Take 40 mg by mouth nightly.  gabapentin (NEURONTIN) 300 mg capsule Take 300 mg by mouth three (3) times daily.  lisinopril (PRINIVIL, ZESTRIL) 2.5 mg tablet Take 5 mg by mouth every morning.  traMADol (ULTRAM) 50 mg tablet Take 50 mg by mouth every six (6) hours as needed for Pain. No current facility-administered medications for this visit. Allergies: (reviewed)  No Known Allergies     Gyn History:   Last pap: 1/2020 normal  History of abnormal pap: denies    OBJECTIVE:    Physical Exam:  VITAL SIGNS: Vitals:    09/28/21 1334 09/28/21 1340   BP: (!) 154/90 (!) 155/97   Pulse: 64 65   Weight: 221 lb (100.2 kg)    Height: 5' 4\" (1.626 m)      Body mass index is 37.93 kg/m². GENERAL ZAHIDA: Conversant, alert, oriented. No acute distress. HEENT: HEENT. No thyroid enlargement. No JVD. Neck: Supple without restrictions. RESPIRATORY: Clear to auscultation and percussion to the bases. No CVAT. CARDIOVASC: RRR without murmur/rub. GASTROINT: soft, non-tender, without masses or organomegaly   MUSCULOSKEL: no joint tenderness, deformity or swelling       EXTREMITIES: extremities normal, atraumatic, no cyanosis or edema   PELVIC: Exam chaperoned by nurse. Normal appearing external genitalia. On speculum exam, normal appearing vagina and cervix. On bimanual exam, the cervix and uterus are normal size and mobile. No evidence of adnexal masses or nodularity. RECTAL: deferred   ROLAND SURVEY: No suspicious lymphadenopathy or edema noted. NEURO: Grossly intact. No acute deficit.        Lab Date as available:    Lab Results   Component Value Date/Time    WBC 7.2 02/13/2020 10:53 AM    HGB 13.3 02/13/2020 10:53 AM    HCT 42.0 02/13/2020 10:53 AM    PLATELET 088 78/41/8436 10:53 AM    MCV 89.0 02/13/2020 10:53 AM     Lab Results   Component Value Date/Time    Sodium 140 02/13/2020 10:53 AM    Potassium 4.0 02/13/2020 10:53 AM    Chloride 107 02/13/2020 10:53 AM    CO2 30 02/13/2020 10:53 AM    Anion gap 3 (L) 02/13/2020 10:53 AM    Glucose 90 02/13/2020 10:53 AM    BUN 9 02/13/2020 10:53 AM    Creatinine 1.09 (H) 02/13/2020 10:53 AM    BUN/Creatinine ratio 8 (L) 02/13/2020 10:53 AM    GFR est AA >60 02/13/2020 10:53 AM    GFR est non-AA 51 (L) 02/13/2020 10:53 AM    Calcium 9.1 02/13/2020 10:53 AM         IMPRESSION/PLAN:    Ms. Violet Angelo is a 73 y.o. female with a working diagnosis of PMB and history of complex endometrial hyperplasia without atypia in 2/2020 s/p progesterone therapy. Problems:     Patient Active Problem List    Diagnosis Date Noted    PMB (postmenopausal bleeding) 10/04/2021    Complex endometrial hyperplasia without atypia 10/04/2021    Sleep apnea     Morbid obesity ([address]     High cholesterol     Depression     Severe obesity with body mass index (BMI) of 35.0 to 39.9 with serious comorbidity ([address] 09/19/2018    HTN (hypertension) 09/19/2018       I reviewed Ms. Candy Sandoval course to date, including her medical records, recent studies, physical exam, and review of symptoms. Counseled patient regarding her PMB and her history of complex endometrial hyperplasia without atypia, which demonstrates an approximately 10% risk of progression or underlying presence of cancer. Counseled patient regarding standard of care recommendations for either a repeat hysteroscopy/D&C versus definitive surgical resection with a hysterectomy and possible staging if indicated based on frozen pathology. I have recommended proceeding with sentinel lymph node mapping and definitive hysterectomy with frozen section at the time of surgery, and the patient is in agreement and would like to proceed with this plan of care. If there is an endometrial cancer present on frozen pathology, will plan to proceed with sentinel lymph node dissection and surgical staging as indicated.  Given the patient's age, I have also recommended removal of both tubes and ovaries regardless of the frozen pathology. Plan for TLH/BSO with sentinel lymph node mapping, followed by possible SLND, possible pelvic and/or para-aortic LND, and possible exploratory laparotomy. Posted for surgery on 10/14/2021. Plan for CBCD, CMP, HbA1c, EKG, and CXR prior to surgery. Counseled patient regarding risks, benefits, indications, and alternatives to surgery. Plan to sign consents day of surgery. All questions and concerns were addressed with the patient and she is comfortable with the plan.     Defined Sensitive Document    >50% of total time allocated to visit dedicated to counseling, 60 minutes total.    Signed By: Majo Rodney MD     10/4/2021/1:42 PM

## 2021-09-28 NOTE — H&P (VIEW-ONLY)
[address] [place] [address] [address], [address] [facility]  P [phone]  F [phone]    Office Note  Patient ID:  Name:  Shelbie Stock  MRN:  [id number]  :  1955/66 y.o. Date:  10/4/2021      HISTORY OF PRESENT ILLNESS:  Ms. Shelbie Stock is a 73 y.o.  postmenopausal female who presents as a new patient from Dr. Yvonne Cardona for postmenopausal bleeding. The patient is a poor historian, and the majority of her history is pieced together through her chart. The patient reports vaginal spotting about 2 months ago. Upon further review she reports having the same problem last year. She underwent a D&C in  with Dr. Rosalba Steward, which demonstrated \"complex hyperplasia without atypia arisign within endometrial polyp\". She was started on progesterone therapy at that time. She ultimately stopped this therapy at some point, although that is unclear. She subsequently underwent a repeat endometrial biopsy in 2020 with Dr. Yvonne Cardona which was negative for dysplasia. Given the continued vaginal spotting/bleeding, she underwent another EMB with Dr. Yvonne Cardona on 2021 which demonstrated \"extremely scan endometrial tissue in a background of clotted blood\". She was subsequently referred to our office for further management. Continues to report spotting. Denies pelvic or abdominal pain. Denies CP or SOB. Denies urinary symptoms. Pertinent PMH/PSH: HTN, Obesity, complex endometrial hyperplasia without atypia in 2020. Active, no restrictions. Pathology Review:   2021:  * * *FINAL PATHOLOGIC DIAGNOSIS* * *   Endometrium, biopsy:        Extremely scant endometrial tissue in a background of clotted blood   (see comment). * * *Comment* * *   There is insufficient endometrial tissue for evaluation.  Recommend   sampling, as clinically indicated.      2020:  * * *FINAL PATHOLOGIC DIAGNOSIS* * *  Endometrium, curetting:        Complex hyperplasia without atypia arising within endometrial polyp        ROS:  A comprehensive review of systems was negative except for that written in the History of Present Illness. , 10 point ROS    OB/GYN ROS:  Per HPI    ECOG ndGndrndanddndend:nd nd2nd Problem List:  Patient Active Problem List    Diagnosis Date Noted    PMB (postmenopausal bleeding) 10/04/2021    Complex endometrial hyperplasia without atypia 10/04/2021    Sleep apnea     Morbid obesity ([address]     High cholesterol     Depression     Severe obesity with body mass index (BMI) of 35.0 to 39.9 with serious comorbidity ([address] 09/19/2018    HTN (hypertension) 09/19/2018     PMH:  Past Medical History:   Diagnosis Date    Arthritis     OSTEO- GENERALIZED    Depression     High cholesterol     HTN (hypertension)     Morbid obesity ([address]     Psychiatric disorder     Seasonal allergies     Sleep apnea     USE CPAP      PSH:  Past Surgical History:   Procedure Laterality Date    HX COLONOSCOPY      HX KNEE ARTHROSCOPY Right     HX OOPHORECTOMY      KNEE ARTHROSCP HARV      LIGATE FALLOPIAN TUBE        Social History:  Social History     Tobacco Use    Smoking status: Former Smoker     Quit date: 2006     Years since quitting: 15.7    Smokeless tobacco: Never Used   Substance Use Topics    Alcohol use: Yes      Family History:  Family History   Problem Relation Age of Onset    Hypertension Mother     Heart Disease Mother     Diabetes Mother     Breast Cancer Mother     No Known Problems Father     No Known Problems Sister     Pneumonia Brother     No Known Problems Daughter     No Known Problems Son     Anesth Problems Neg Hx       Medications: (reviewed)  Current Outpatient Medications   Medication Sig    metoprolol tartrate (LOPRESSOR) 25 mg tablet Take 25 mg by mouth two (2) times a day.     medroxyPROGESTERone (PROVERA) 10 mg tablet TAKE 2 TABS BY MOUTH DAILY    ibuprofen (MOTRIN) 600 mg tablet TAKE 1 TABLET BY MOUTH EVERY 6 HOURS AS NEEDED FOR PAIN    aspirin delayed-release 81 mg tablet Take  by mouth every morning.  pravastatin (PRAVACHOL) 40 mg tablet Take 40 mg by mouth nightly.  gabapentin (NEURONTIN) 300 mg capsule Take 300 mg by mouth three (3) times daily.  lisinopril (PRINIVIL, ZESTRIL) 2.5 mg tablet Take 5 mg by mouth every morning.  traMADol (ULTRAM) 50 mg tablet Take 50 mg by mouth every six (6) hours as needed for Pain. No current facility-administered medications for this visit. Allergies: (reviewed)  No Known Allergies     Gyn History:   Last pap: 1/2020 normal  History of abnormal pap: denies    OBJECTIVE:    Physical Exam:  VITAL SIGNS: Vitals:    09/28/21 1334 09/28/21 1340   BP: (!) 154/90 (!) 155/97   Pulse: 64 65   Weight: 221 lb (100.2 kg)    Height: 5' 4\" (1.626 m)      Body mass index is 37.93 kg/m². GENERAL ZAHIDA: Conversant, alert, oriented. No acute distress. HEENT: HEENT. No thyroid enlargement. No JVD. Neck: Supple without restrictions. RESPIRATORY: Clear to auscultation and percussion to the bases. No CVAT. CARDIOVASC: RRR without murmur/rub. GASTROINT: soft, non-tender, without masses or organomegaly   MUSCULOSKEL: no joint tenderness, deformity or swelling       EXTREMITIES: extremities normal, atraumatic, no cyanosis or edema   PELVIC: Exam chaperoned by nurse. Normal appearing external genitalia. On speculum exam, normal appearing vagina and cervix. On bimanual exam, the cervix and uterus are normal size and mobile. No evidence of adnexal masses or nodularity. RECTAL: deferred   ROLAND SURVEY: No suspicious lymphadenopathy or edema noted. NEURO: Grossly intact. No acute deficit.        Lab Date as available:    Lab Results   Component Value Date/Time    WBC 7.2 02/13/2020 10:53 AM    HGB 13.3 02/13/2020 10:53 AM    HCT 42.0 02/13/2020 10:53 AM    PLATELET 406 14/91/0664 10:53 AM    MCV 89.0 02/13/2020 10:53 AM     Lab Results   Component Value Date/Time    Sodium 140 02/13/2020 10:53 AM    Potassium 4.0 02/13/2020 10:53 AM    Chloride 107 02/13/2020 10:53 AM    CO2 30 02/13/2020 10:53 AM    Anion gap 3 (L) 02/13/2020 10:53 AM    Glucose 90 02/13/2020 10:53 AM    BUN 9 02/13/2020 10:53 AM    Creatinine 1.09 (H) 02/13/2020 10:53 AM    BUN/Creatinine ratio 8 (L) 02/13/2020 10:53 AM    GFR est AA >60 02/13/2020 10:53 AM    GFR est non-AA 51 (L) 02/13/2020 10:53 AM    Calcium 9.1 02/13/2020 10:53 AM         IMPRESSION/PLAN:    Ms. Geneva Mckinley is a 73 y.o. female with a working diagnosis of PMB and history of complex endometrial hyperplasia without atypia in 2/2020 s/p progesterone therapy. Problems:     Patient Active Problem List    Diagnosis Date Noted    PMB (postmenopausal bleeding) 10/04/2021    Complex endometrial hyperplasia without atypia 10/04/2021    Sleep apnea     Morbid obesity ([address]     High cholesterol     Depression     Severe obesity with body mass index (BMI) of 35.0 to 39.9 with serious comorbidity ([address] 09/19/2018    HTN (hypertension) 09/19/2018       I reviewed Ms. Claudene Spruce course to date, including her medical records, recent studies, physical exam, and review of symptoms. Counseled patient regarding her PMB and her history of complex endometrial hyperplasia without atypia, which demonstrates an approximately 10% risk of progression or underlying presence of cancer. Counseled patient regarding standard of care recommendations for either a repeat hysteroscopy/D&C versus definitive surgical resection with a hysterectomy and possible staging if indicated based on frozen pathology. I have recommended proceeding with sentinel lymph node mapping and definitive hysterectomy with frozen section at the time of surgery, and the patient is in agreement and would like to proceed with this plan of care. If there is an endometrial cancer present on frozen pathology, will plan to proceed with sentinel lymph node dissection and surgical staging as indicated.  Given the patient's age, I have also recommended removal of both tubes and ovaries regardless of the frozen pathology. Plan for TLH/BSO with sentinel lymph node mapping, followed by possible SLND, possible pelvic and/or para-aortic LND, and possible exploratory laparotomy. Posted for surgery on 10/14/2021. Plan for CBCD, CMP, HbA1c, EKG, and CXR prior to surgery. Counseled patient regarding risks, benefits, indications, and alternatives to surgery. Plan to sign consents day of surgery. All questions and concerns were addressed with the patient and she is comfortable with the plan.     Defined Sensitive Document    >50% of total time allocated to visit dedicated to counseling, 60 minutes total.    Signed By: Barbara Benitez MD     10/4/2021/1:42 PM

## 2021-09-28 NOTE — LETTER
10/4/2021    Patient: Rodríguez Alcala   YOB: 1955   Date of Visit: 9/28/2021     Charlotte Ndiaye MD  [facility] [id number]  Via Fax: [address] [facility], [place]  [facility] [place] [id number]  Via In [facility] [address]    Dear MD Angela Roldan MD,      Thank you for referring Ms. Rodríguez Alcala [address] for [address]. My notes for this consultation are attached. If you have questions, please do not hesitate to call me. I look forward to following your patient along with you.       Sincerely,    Manan Edmond MD

## 2021-10-04 PROBLEM — N85.01 COMPLEX ENDOMETRIAL HYPERPLASIA WITHOUT ATYPIA: Status: ACTIVE | Noted: 2021-10-04

## 2021-10-04 PROBLEM — N95.0 PMB (POSTMENOPAUSAL BLEEDING): Status: ACTIVE | Noted: 2021-10-04

## 2021-10-07 ENCOUNTER — TRANSCRIBE ORDER (OUTPATIENT)
Dept: REGISTRATION | Age: 66
End: 2021-10-07

## 2021-10-07 DIAGNOSIS — Z01.812 ENCOUNTER FOR PREOPERATIVE SCREENING LABORATORY TESTING FOR COVID-19 VIRUS: Primary | ICD-10-CM

## 2021-10-07 DIAGNOSIS — Z20.822 ENCOUNTER FOR PREOPERATIVE SCREENING LABORATORY TESTING FOR COVID-19 VIRUS: Primary | ICD-10-CM

## 2021-10-11 ENCOUNTER — HOSPITAL ENCOUNTER (OUTPATIENT)
Dept: PREADMISSION TESTING | Age: 66
Discharge: HOME | End: 2021-10-11
Payer: MEDICARE

## 2021-10-11 ENCOUNTER — HOSPITAL ENCOUNTER (OUTPATIENT)
Dept: GENERAL RADIOLOGY | Age: 66
Discharge: HOME | End: 2021-10-11
Attending: OBSTETRICS & GYNECOLOGY
Payer: MEDICARE

## 2021-10-11 VITALS
HEIGHT: 65 IN | DIASTOLIC BLOOD PRESSURE: 82 MMHG | OXYGEN SATURATION: 97 % | WEIGHT: 220.68 LBS | BODY MASS INDEX: 36.77 KG/M2 | HEART RATE: 62 BPM | TEMPERATURE: 99.7 F | SYSTOLIC BLOOD PRESSURE: 138 MMHG

## 2021-10-11 DIAGNOSIS — Z01.812 ENCOUNTER FOR PREOPERATIVE SCREENING LABORATORY TESTING FOR COVID-19 VIRUS: ICD-10-CM

## 2021-10-11 DIAGNOSIS — Z20.822 ENCOUNTER FOR PREOPERATIVE SCREENING LABORATORY TESTING FOR COVID-19 VIRUS: ICD-10-CM

## 2021-10-11 LAB
ALBUMIN SERPL-MCNC: 3.4 G/DL (ref 3.5–5)
ALBUMIN/GLOB SERPL: 0.8 {RATIO} (ref 1.1–2.2)
ALP SERPL-CCNC: 57 U/L (ref 45–117)
ALT SERPL-CCNC: 17 U/L (ref 12–78)
ANION GAP SERPL CALC-SCNC: 5 MMOL/L (ref 5–15)
AST SERPL-CCNC: 14 U/L (ref 15–37)
BASOPHILS # BLD: 0 K/UL (ref 0–0.1)
BASOPHILS NFR BLD: 0 % (ref 0–1)
BILIRUB SERPL-MCNC: 0.6 MG/DL (ref 0.2–1)
BUN SERPL-MCNC: 9 MG/DL (ref 6–20)
BUN/CREAT SERPL: 8 (ref 12–20)
CALCIUM SERPL-MCNC: 9 MG/DL (ref 8.5–10.1)
CHLORIDE SERPL-SCNC: 108 MMOL/L (ref 97–108)
CO2 SERPL-SCNC: 27 MMOL/L (ref 21–32)
CREAT SERPL-MCNC: 1.16 MG/DL (ref 0.55–1.02)
DIFFERENTIAL METHOD BLD: NORMAL
EOSINOPHIL # BLD: 0.2 K/UL (ref 0–0.4)
EOSINOPHIL NFR BLD: 2 % (ref 0–7)
ERYTHROCYTE [DISTWIDTH] IN BLOOD BY AUTOMATED COUNT: 13.9 % (ref 11.5–14.5)
GLOBULIN SER CALC-MCNC: 4.3 G/DL (ref 2–4)
GLUCOSE SERPL-MCNC: 90 MG/DL (ref 65–100)
HCT VFR BLD AUTO: 39.7 % (ref 35–47)
HGB BLD-MCNC: 13.1 G/DL (ref 11.5–16)
IMM GRANULOCYTES # BLD AUTO: 0 K/UL (ref 0–0.04)
IMM GRANULOCYTES NFR BLD AUTO: 0 % (ref 0–0.5)
LYMPHOCYTES # BLD: 2.5 K/UL (ref 0.8–3.5)
LYMPHOCYTES NFR BLD: 37 % (ref 12–49)
MCH RBC QN AUTO: 29.4 PG (ref 26–34)
MCHC RBC AUTO-ENTMCNC: 33 G/DL (ref 30–36.5)
MCV RBC AUTO: 89 FL (ref 80–99)
MONOCYTES # BLD: 0.4 K/UL (ref 0–1)
MONOCYTES NFR BLD: 6 % (ref 5–13)
NEUTS SEG # BLD: 3.6 K/UL (ref 1.8–8)
NEUTS SEG NFR BLD: 55 % (ref 32–75)
NRBC # BLD: 0 K/UL (ref 0–0.01)
NRBC BLD-RTO: 0 PER 100 WBC
PLATELET # BLD AUTO: 225 K/UL (ref 150–400)
PMV BLD AUTO: 12.3 FL (ref 8.9–12.9)
POTASSIUM SERPL-SCNC: 3.8 MMOL/L (ref 3.5–5.1)
PROT SERPL-MCNC: 7.7 G/DL (ref 6.4–8.2)
RBC # BLD AUTO: 4.46 M/UL (ref 3.8–5.2)
SODIUM SERPL-SCNC: 140 MMOL/L (ref 136–145)
WBC # BLD AUTO: 6.7 K/UL (ref 3.6–11)

## 2021-10-11 PROCEDURE — 85025 COMPLETE CBC W/AUTO DIFF WBC: CPT

## 2021-10-11 PROCEDURE — 93005 ELECTROCARDIOGRAM TRACING: CPT

## 2021-10-11 PROCEDURE — 71046 X-RAY EXAM CHEST 2 VIEWS: CPT

## 2021-10-11 PROCEDURE — U0005 INFEC AGEN DETEC AMPLI PROBE: HCPCS

## 2021-10-11 PROCEDURE — 80053 COMPREHEN METABOLIC PANEL: CPT

## 2021-10-11 RX ORDER — ACETAMINOPHEN 500 MG
500 TABLET ORAL
COMMUNITY

## 2021-10-11 NOTE — PERIOP NOTES
PAT instructions reviewed with patient and given the opportunity to ask questions  . Patient given surgical site information FAQs handout and reviewed. Patient given two bottles CHG soap and instruction sheet, instructions for use reviewed with patient. Patient reminded to follow Dr. Andrei Lin instruction for bowel prep prior to surgery. Instructed to bring CPAP to hospital.    Patient made aware of COVID 19 testing to be done 96  hours prior to surgery. Patient instructed to self quarantine between testing and arrival time day of surgery. Patient instructed re: check-in procedure for day of surgery.

## 2021-10-12 LAB
ATRIAL RATE: 57 BPM
CALCULATED P AXIS, ECG09: 61 DEGREES
CALCULATED R AXIS, ECG10: -5 DEGREES
CALCULATED T AXIS, ECG11: 49 DEGREES
DIAGNOSIS, 93000: NORMAL
P-R INTERVAL, ECG05: 136 MS
Q-T INTERVAL, ECG07: 440 MS
QRS DURATION, ECG06: 84 MS
QTC CALCULATION (BEZET), ECG08: 428 MS
SARS-COV-2, XPLCVT: NOT DETECTED
SOURCE, COVRS: NORMAL
VENTRICULAR RATE, ECG03: 57 BPM

## 2021-10-13 ENCOUNTER — ANESTHESIA EVENT (OUTPATIENT)
Dept: SURGERY | Age: 66
End: 2021-10-13
Payer: MEDICARE

## 2021-10-14 ENCOUNTER — ANESTHESIA (OUTPATIENT)
Dept: SURGERY | Age: 66
End: 2021-10-14
Payer: MEDICARE

## 2021-10-14 ENCOUNTER — HOSPITAL ENCOUNTER (OUTPATIENT)
Age: 66
Discharge: HOME | End: 2021-10-15
Attending: OBSTETRICS & GYNECOLOGY | Admitting: OBSTETRICS & GYNECOLOGY
Payer: MEDICARE

## 2021-10-14 DIAGNOSIS — G89.18 POSTOPERATIVE PAIN: Primary | ICD-10-CM

## 2021-10-14 PROBLEM — N95.0 POSTMENOPAUSAL BLEEDING: Status: ACTIVE | Noted: 2021-10-14

## 2021-10-14 PROCEDURE — 77030031139 HC SUT VCRL2 J&J -A: Performed by: OBSTETRICS & GYNECOLOGY

## 2021-10-14 PROCEDURE — 77030022704 HC SUT VLOC COVD -B: Performed by: OBSTETRICS & GYNECOLOGY

## 2021-10-14 PROCEDURE — 74011000250 HC RX REV CODE- 250: Performed by: NURSE ANESTHETIST, CERTIFIED REGISTERED

## 2021-10-14 PROCEDURE — 74011000250 HC RX REV CODE- 250: Performed by: OBSTETRICS & GYNECOLOGY

## 2021-10-14 PROCEDURE — 77030040922 HC BLNKT HYPOTHRM STRY -A

## 2021-10-14 PROCEDURE — 2709999900 HC NON-CHARGEABLE SUPPLY: Performed by: OBSTETRICS & GYNECOLOGY

## 2021-10-14 PROCEDURE — 99218 HC RM OBSERVATION: CPT

## 2021-10-14 PROCEDURE — 77030012799 HC TRCR GELPRT BLN AMR -B: Performed by: OBSTETRICS & GYNECOLOGY

## 2021-10-14 PROCEDURE — 77030014008 HC SPNG HEMSTAT J&J -C: Performed by: OBSTETRICS & GYNECOLOGY

## 2021-10-14 PROCEDURE — 88112 CYTOPATH CELL ENHANCE TECH: CPT

## 2021-10-14 PROCEDURE — 74011000254 HC RX REV CODE- 254: Performed by: OBSTETRICS & GYNECOLOGY

## 2021-10-14 PROCEDURE — 77030008684 HC TU ET CUF COVD -B: Performed by: ANESTHESIOLOGY

## 2021-10-14 PROCEDURE — 74011250637 HC RX REV CODE- 250/637: Performed by: PHYSICIAN ASSISTANT

## 2021-10-14 PROCEDURE — 77030013079 HC BLNKT BAIR HGGR 3M -A: Performed by: ANESTHESIOLOGY

## 2021-10-14 PROCEDURE — 88332 PATH CONSLTJ SURG EA ADD BLK: CPT

## 2021-10-14 PROCEDURE — 77030018778 HC MANIP UTER VCAR CNMD -B: Performed by: OBSTETRICS & GYNECOLOGY

## 2021-10-14 PROCEDURE — 77030009957 HC RELD ENDOSTCH COVD -C: Performed by: OBSTETRICS & GYNECOLOGY

## 2021-10-14 PROCEDURE — 88307 TISSUE EXAM BY PATHOLOGIST: CPT

## 2021-10-14 PROCEDURE — 77030010031 HC SCIS ENDOSC MPLR J&J -C: Performed by: OBSTETRICS & GYNECOLOGY

## 2021-10-14 PROCEDURE — 88331 PATH CONSLTJ SURG 1 BLK 1SPC: CPT

## 2021-10-14 PROCEDURE — 77030008602 HC TRCR ENDOSC EPATH J&J -B: Performed by: OBSTETRICS & GYNECOLOGY

## 2021-10-14 PROCEDURE — 74011250637 HC RX REV CODE- 250/637: Performed by: STUDENT IN AN ORGANIZED HEALTH CARE EDUCATION/TRAINING PROGRAM

## 2021-10-14 PROCEDURE — 76060000035 HC ANESTHESIA 2 TO 2.5 HR: Performed by: OBSTETRICS & GYNECOLOGY

## 2021-10-14 PROCEDURE — 74011250636 HC RX REV CODE- 250/636: Performed by: ANESTHESIOLOGY

## 2021-10-14 PROCEDURE — 77030002933 HC SUT MCRYL J&J -A: Performed by: OBSTETRICS & GYNECOLOGY

## 2021-10-14 PROCEDURE — 74011250636 HC RX REV CODE- 250/636: Performed by: STUDENT IN AN ORGANIZED HEALTH CARE EDUCATION/TRAINING PROGRAM

## 2021-10-14 PROCEDURE — 74011250636 HC RX REV CODE- 250/636: Performed by: OBSTETRICS & GYNECOLOGY

## 2021-10-14 PROCEDURE — 77030040830 HC CATH URETH FOL MDII -A: Performed by: OBSTETRICS & GYNECOLOGY

## 2021-10-14 PROCEDURE — 76010000153 HC OR TIME 1.5 TO 2 HR: Performed by: OBSTETRICS & GYNECOLOGY

## 2021-10-14 PROCEDURE — 77030008603 HC TRCR ENDOSC EPATH J&J -C: Performed by: OBSTETRICS & GYNECOLOGY

## 2021-10-14 PROCEDURE — 76210000017 HC OR PH I REC 1.5 TO 2 HR: Performed by: OBSTETRICS & GYNECOLOGY

## 2021-10-14 PROCEDURE — 77030026438 HC STYL ET INTUB CARD -A: Performed by: ANESTHESIOLOGY

## 2021-10-14 PROCEDURE — 74011250636 HC RX REV CODE- 250/636: Performed by: NURSE ANESTHETIST, CERTIFIED REGISTERED

## 2021-10-14 PROCEDURE — 74011250636 HC RX REV CODE- 250/636: Performed by: PHYSICIAN ASSISTANT

## 2021-10-14 RX ORDER — LISINOPRIL 5 MG/1
2.5 TABLET ORAL 2 TIMES DAILY
Status: DISCONTINUED | OUTPATIENT
Start: 2021-10-14 | End: 2021-10-15 | Stop reason: HOSPADM

## 2021-10-14 RX ORDER — ONDANSETRON 2 MG/ML
INJECTION INTRAMUSCULAR; INTRAVENOUS AS NEEDED
Status: DISCONTINUED | OUTPATIENT
Start: 2021-10-14 | End: 2021-10-14 | Stop reason: HOSPADM

## 2021-10-14 RX ORDER — ACETAMINOPHEN 500 MG
1000 TABLET ORAL ONCE
Status: COMPLETED | OUTPATIENT
Start: 2021-10-14 | End: 2021-10-14

## 2021-10-14 RX ORDER — FENTANYL CITRATE 50 UG/ML
25 INJECTION, SOLUTION INTRAMUSCULAR; INTRAVENOUS
Status: DISCONTINUED | OUTPATIENT
Start: 2021-10-14 | End: 2021-10-14 | Stop reason: HOSPADM

## 2021-10-14 RX ORDER — OXYCODONE AND ACETAMINOPHEN 5; 325 MG/1; MG/1
1 TABLET ORAL AS NEEDED
Status: DISCONTINUED | OUTPATIENT
Start: 2021-10-14 | End: 2021-10-14 | Stop reason: HOSPADM

## 2021-10-14 RX ORDER — HYDRALAZINE HYDROCHLORIDE 20 MG/ML
INJECTION INTRAMUSCULAR; INTRAVENOUS AS NEEDED
Status: DISCONTINUED | OUTPATIENT
Start: 2021-10-14 | End: 2021-10-14 | Stop reason: HOSPADM

## 2021-10-14 RX ORDER — GABAPENTIN 300 MG/1
300 CAPSULE ORAL 3 TIMES DAILY
Status: DISCONTINUED | OUTPATIENT
Start: 2021-10-14 | End: 2021-10-15 | Stop reason: HOSPADM

## 2021-10-14 RX ORDER — HYDROMORPHONE HYDROCHLORIDE 1 MG/ML
0.5 INJECTION, SOLUTION INTRAMUSCULAR; INTRAVENOUS; SUBCUTANEOUS
Status: DISCONTINUED | OUTPATIENT
Start: 2021-10-14 | End: 2021-10-15 | Stop reason: HOSPADM

## 2021-10-14 RX ORDER — FENTANYL CITRATE 50 UG/ML
50 INJECTION, SOLUTION INTRAMUSCULAR; INTRAVENOUS AS NEEDED
Status: DISCONTINUED | OUTPATIENT
Start: 2021-10-14 | End: 2021-10-14 | Stop reason: HOSPADM

## 2021-10-14 RX ORDER — PROCHLORPERAZINE EDISYLATE 5 MG/ML
10 INJECTION INTRAMUSCULAR; INTRAVENOUS
Status: DISCONTINUED | OUTPATIENT
Start: 2021-10-14 | End: 2021-10-15 | Stop reason: HOSPADM

## 2021-10-14 RX ORDER — SODIUM CHLORIDE 0.9 % (FLUSH) 0.9 %
5-40 SYRINGE (ML) INJECTION AS NEEDED
Status: DISCONTINUED | OUTPATIENT
Start: 2021-10-14 | End: 2021-10-15 | Stop reason: HOSPADM

## 2021-10-14 RX ORDER — SODIUM CHLORIDE 9 MG/ML
1000 INJECTION, SOLUTION INTRAVENOUS CONTINUOUS
Status: DISCONTINUED | OUTPATIENT
Start: 2021-10-14 | End: 2021-10-14 | Stop reason: HOSPADM

## 2021-10-14 RX ORDER — MORPHINE SULFATE 2 MG/ML
2 INJECTION, SOLUTION INTRAMUSCULAR; INTRAVENOUS
Status: DISCONTINUED | OUTPATIENT
Start: 2021-10-14 | End: 2021-10-14 | Stop reason: HOSPADM

## 2021-10-14 RX ORDER — SODIUM CHLORIDE 0.9 % (FLUSH) 0.9 %
5-40 SYRINGE (ML) INJECTION AS NEEDED
Status: DISCONTINUED | OUTPATIENT
Start: 2021-10-14 | End: 2021-10-14 | Stop reason: HOSPADM

## 2021-10-14 RX ORDER — LIDOCAINE HYDROCHLORIDE 10 MG/ML
0.1 INJECTION, SOLUTION EPIDURAL; INFILTRATION; INTRACAUDAL; PERINEURAL AS NEEDED
Status: DISCONTINUED | OUTPATIENT
Start: 2021-10-14 | End: 2021-10-14 | Stop reason: HOSPADM

## 2021-10-14 RX ORDER — HYDRALAZINE HYDROCHLORIDE 20 MG/ML
10 INJECTION INTRAMUSCULAR; INTRAVENOUS ONCE
Status: COMPLETED | OUTPATIENT
Start: 2021-10-14 | End: 2021-10-14

## 2021-10-14 RX ORDER — SCOLOPAMINE TRANSDERMAL SYSTEM 1 MG/1
1 PATCH, EXTENDED RELEASE TRANSDERMAL
Status: DISCONTINUED | OUTPATIENT
Start: 2021-10-14 | End: 2021-10-15 | Stop reason: HOSPADM

## 2021-10-14 RX ORDER — SODIUM CHLORIDE 0.9 % (FLUSH) 0.9 %
5-40 SYRINGE (ML) INJECTION EVERY 8 HOURS
Status: DISCONTINUED | OUTPATIENT
Start: 2021-10-14 | End: 2021-10-14 | Stop reason: HOSPADM

## 2021-10-14 RX ORDER — LIDOCAINE HYDROCHLORIDE 20 MG/ML
INJECTION, SOLUTION EPIDURAL; INFILTRATION; INTRACAUDAL; PERINEURAL AS NEEDED
Status: DISCONTINUED | OUTPATIENT
Start: 2021-10-14 | End: 2021-10-14 | Stop reason: HOSPADM

## 2021-10-14 RX ORDER — FENTANYL CITRATE 50 UG/ML
INJECTION, SOLUTION INTRAMUSCULAR; INTRAVENOUS AS NEEDED
Status: DISCONTINUED | OUTPATIENT
Start: 2021-10-14 | End: 2021-10-14 | Stop reason: HOSPADM

## 2021-10-14 RX ORDER — DEXMEDETOMIDINE HYDROCHLORIDE 100 UG/ML
INJECTION, SOLUTION INTRAVENOUS AS NEEDED
Status: DISCONTINUED | OUTPATIENT
Start: 2021-10-14 | End: 2021-10-14 | Stop reason: HOSPADM

## 2021-10-14 RX ORDER — SODIUM CHLORIDE 9 MG/ML
100 INJECTION, SOLUTION INTRAVENOUS CONTINUOUS
Status: DISCONTINUED | OUTPATIENT
Start: 2021-10-14 | End: 2021-10-15 | Stop reason: HOSPADM

## 2021-10-14 RX ORDER — ACETAMINOPHEN 325 MG/1
975 TABLET ORAL EVERY 6 HOURS
Status: DISCONTINUED | OUTPATIENT
Start: 2021-10-14 | End: 2021-10-15 | Stop reason: HOSPADM

## 2021-10-14 RX ORDER — MIDAZOLAM HYDROCHLORIDE 1 MG/ML
0.5 INJECTION, SOLUTION INTRAMUSCULAR; INTRAVENOUS
Status: DISCONTINUED | OUTPATIENT
Start: 2021-10-14 | End: 2021-10-14 | Stop reason: HOSPADM

## 2021-10-14 RX ORDER — SUCCINYLCHOLINE CHLORIDE 20 MG/ML
INJECTION INTRAMUSCULAR; INTRAVENOUS AS NEEDED
Status: DISCONTINUED | OUTPATIENT
Start: 2021-10-14 | End: 2021-10-14 | Stop reason: HOSPADM

## 2021-10-14 RX ORDER — DEXAMETHASONE SODIUM PHOSPHATE 4 MG/ML
INJECTION, SOLUTION INTRA-ARTICULAR; INTRALESIONAL; INTRAMUSCULAR; INTRAVENOUS; SOFT TISSUE AS NEEDED
Status: DISCONTINUED | OUTPATIENT
Start: 2021-10-14 | End: 2021-10-14 | Stop reason: HOSPADM

## 2021-10-14 RX ORDER — TRAMADOL HYDROCHLORIDE 50 MG/1
50 TABLET ORAL
Qty: 20 TABLET | Refills: 0 | Status: SHIPPED | OUTPATIENT
Start: 2021-10-14 | End: 2021-10-15 | Stop reason: SDUPTHER

## 2021-10-14 RX ORDER — ONDANSETRON 2 MG/ML
4 INJECTION INTRAMUSCULAR; INTRAVENOUS
Status: DISCONTINUED | OUTPATIENT
Start: 2021-10-14 | End: 2021-10-15 | Stop reason: HOSPADM

## 2021-10-14 RX ORDER — HYDROMORPHONE HYDROCHLORIDE 2 MG/ML
INJECTION, SOLUTION INTRAMUSCULAR; INTRAVENOUS; SUBCUTANEOUS AS NEEDED
Status: DISCONTINUED | OUTPATIENT
Start: 2021-10-14 | End: 2021-10-14 | Stop reason: HOSPADM

## 2021-10-14 RX ORDER — DIPHENHYDRAMINE HYDROCHLORIDE 50 MG/ML
12.5 INJECTION, SOLUTION INTRAMUSCULAR; INTRAVENOUS AS NEEDED
Status: DISCONTINUED | OUTPATIENT
Start: 2021-10-14 | End: 2021-10-14 | Stop reason: HOSPADM

## 2021-10-14 RX ORDER — HYDRALAZINE HYDROCHLORIDE 20 MG/ML
INJECTION INTRAMUSCULAR; INTRAVENOUS
Status: DISPENSED
Start: 2021-10-14 | End: 2021-10-15

## 2021-10-14 RX ORDER — HYDROMORPHONE HYDROCHLORIDE 1 MG/ML
0.2 INJECTION, SOLUTION INTRAMUSCULAR; INTRAVENOUS; SUBCUTANEOUS
Status: DISCONTINUED | OUTPATIENT
Start: 2021-10-14 | End: 2021-10-14 | Stop reason: HOSPADM

## 2021-10-14 RX ORDER — ONDANSETRON 2 MG/ML
4 INJECTION INTRAMUSCULAR; INTRAVENOUS AS NEEDED
Status: DISCONTINUED | OUTPATIENT
Start: 2021-10-14 | End: 2021-10-14 | Stop reason: HOSPADM

## 2021-10-14 RX ORDER — EPHEDRINE SULFATE/0.9% NACL/PF 50 MG/5 ML
5 SYRINGE (ML) INTRAVENOUS AS NEEDED
Status: DISCONTINUED | OUTPATIENT
Start: 2021-10-14 | End: 2021-10-14 | Stop reason: HOSPADM

## 2021-10-14 RX ORDER — SODIUM CHLORIDE 0.9 % (FLUSH) 0.9 %
5-40 SYRINGE (ML) INJECTION EVERY 8 HOURS
Status: DISCONTINUED | OUTPATIENT
Start: 2021-10-14 | End: 2021-10-15 | Stop reason: HOSPADM

## 2021-10-14 RX ORDER — METOPROLOL TARTRATE 25 MG/1
25 TABLET, FILM COATED ORAL 2 TIMES DAILY
Status: DISCONTINUED | OUTPATIENT
Start: 2021-10-14 | End: 2021-10-15 | Stop reason: HOSPADM

## 2021-10-14 RX ORDER — DOCUSATE SODIUM 100 MG/1
200 CAPSULE, LIQUID FILLED ORAL DAILY
Status: DISCONTINUED | OUTPATIENT
Start: 2021-10-15 | End: 2021-10-15 | Stop reason: HOSPADM

## 2021-10-14 RX ORDER — MIDAZOLAM HYDROCHLORIDE 1 MG/ML
INJECTION, SOLUTION INTRAMUSCULAR; INTRAVENOUS AS NEEDED
Status: DISCONTINUED | OUTPATIENT
Start: 2021-10-14 | End: 2021-10-14 | Stop reason: HOSPADM

## 2021-10-14 RX ORDER — DIPHENHYDRAMINE HYDROCHLORIDE 50 MG/ML
12.5 INJECTION, SOLUTION INTRAMUSCULAR; INTRAVENOUS
Status: DISCONTINUED | OUTPATIENT
Start: 2021-10-14 | End: 2021-10-15 | Stop reason: HOSPADM

## 2021-10-14 RX ORDER — BUPIVACAINE HYDROCHLORIDE 2.5 MG/ML
INJECTION, SOLUTION EPIDURAL; INFILTRATION; INTRACAUDAL AS NEEDED
Status: DISCONTINUED | OUTPATIENT
Start: 2021-10-14 | End: 2021-10-14 | Stop reason: HOSPADM

## 2021-10-14 RX ORDER — NALOXONE HYDROCHLORIDE 0.4 MG/ML
0.4 INJECTION, SOLUTION INTRAMUSCULAR; INTRAVENOUS; SUBCUTANEOUS AS NEEDED
Status: DISCONTINUED | OUTPATIENT
Start: 2021-10-14 | End: 2021-10-15 | Stop reason: HOSPADM

## 2021-10-14 RX ORDER — INDOCYANINE GREEN AND WATER 25 MG
KIT INJECTION AS NEEDED
Status: DISCONTINUED | OUTPATIENT
Start: 2021-10-14 | End: 2021-10-14 | Stop reason: HOSPADM

## 2021-10-14 RX ORDER — MIDAZOLAM HYDROCHLORIDE 1 MG/ML
1 INJECTION, SOLUTION INTRAMUSCULAR; INTRAVENOUS AS NEEDED
Status: DISCONTINUED | OUTPATIENT
Start: 2021-10-14 | End: 2021-10-14 | Stop reason: HOSPADM

## 2021-10-14 RX ORDER — SODIUM CHLORIDE, SODIUM LACTATE, POTASSIUM CHLORIDE, CALCIUM CHLORIDE 600; 310; 30; 20 MG/100ML; MG/100ML; MG/100ML; MG/100ML
1000 INJECTION, SOLUTION INTRAVENOUS CONTINUOUS
Status: DISCONTINUED | OUTPATIENT
Start: 2021-10-14 | End: 2021-10-14 | Stop reason: HOSPADM

## 2021-10-14 RX ORDER — PROPOFOL 10 MG/ML
INJECTION, EMULSION INTRAVENOUS AS NEEDED
Status: DISCONTINUED | OUTPATIENT
Start: 2021-10-14 | End: 2021-10-14 | Stop reason: HOSPADM

## 2021-10-14 RX ORDER — ROCURONIUM BROMIDE 10 MG/ML
INJECTION, SOLUTION INTRAVENOUS AS NEEDED
Status: DISCONTINUED | OUTPATIENT
Start: 2021-10-14 | End: 2021-10-14 | Stop reason: HOSPADM

## 2021-10-14 RX ORDER — TRAMADOL HYDROCHLORIDE 50 MG/1
50-100 TABLET ORAL
Status: DISCONTINUED | OUTPATIENT
Start: 2021-10-14 | End: 2021-10-15 | Stop reason: HOSPADM

## 2021-10-14 RX ORDER — SODIUM CHLORIDE 9 MG/ML
125 INJECTION, SOLUTION INTRAVENOUS CONTINUOUS
Status: DISCONTINUED | OUTPATIENT
Start: 2021-10-14 | End: 2021-10-14 | Stop reason: HOSPADM

## 2021-10-14 RX ORDER — SODIUM CHLORIDE, SODIUM LACTATE, POTASSIUM CHLORIDE, CALCIUM CHLORIDE 600; 310; 30; 20 MG/100ML; MG/100ML; MG/100ML; MG/100ML
125 INJECTION, SOLUTION INTRAVENOUS CONTINUOUS
Status: DISCONTINUED | OUTPATIENT
Start: 2021-10-14 | End: 2021-10-14 | Stop reason: HOSPADM

## 2021-10-14 RX ADMIN — FENTANYL CITRATE 50 MCG: 50 INJECTION, SOLUTION INTRAMUSCULAR; INTRAVENOUS at 13:12

## 2021-10-14 RX ADMIN — HYDROMORPHONE HYDROCHLORIDE 0.5 MG: 2 INJECTION, SOLUTION INTRAMUSCULAR; INTRAVENOUS; SUBCUTANEOUS at 13:30

## 2021-10-14 RX ADMIN — ACETAMINOPHEN 975 MG: 325 TABLET ORAL at 18:53

## 2021-10-14 RX ADMIN — ROCURONIUM BROMIDE 10 MG: 10 SOLUTION INTRAVENOUS at 14:03

## 2021-10-14 RX ADMIN — PROPOFOL 30 MG: 10 INJECTION, EMULSION INTRAVENOUS at 13:14

## 2021-10-14 RX ADMIN — GABAPENTIN 300 MG: 300 CAPSULE ORAL at 18:55

## 2021-10-14 RX ADMIN — SUGAMMADEX 300 MG: 100 INJECTION, SOLUTION INTRAVENOUS at 14:44

## 2021-10-14 RX ADMIN — DEXMEDETOMIDINE HYDROCHLORIDE 8 MCG: 100 INJECTION, SOLUTION, CONCENTRATE INTRAVENOUS at 13:54

## 2021-10-14 RX ADMIN — PROPOFOL 50 MG: 10 INJECTION, EMULSION INTRAVENOUS at 13:30

## 2021-10-14 RX ADMIN — DEXMEDETOMIDINE HYDROCHLORIDE 8 MCG: 100 INJECTION, SOLUTION, CONCENTRATE INTRAVENOUS at 13:34

## 2021-10-14 RX ADMIN — HYDRALAZINE HYDROCHLORIDE 10 MG: 20 INJECTION INTRAMUSCULAR; INTRAVENOUS at 14:00

## 2021-10-14 RX ADMIN — HYDROMORPHONE HYDROCHLORIDE 0.5 MG: 2 INJECTION, SOLUTION INTRAMUSCULAR; INTRAVENOUS; SUBCUTANEOUS at 13:45

## 2021-10-14 RX ADMIN — FENTANYL CITRATE 25 MCG: 50 INJECTION, SOLUTION INTRAMUSCULAR; INTRAVENOUS at 13:02

## 2021-10-14 RX ADMIN — ROCURONIUM BROMIDE 35 MG: 10 SOLUTION INTRAVENOUS at 13:09

## 2021-10-14 RX ADMIN — LISINOPRIL 2.5 MG: 5 TABLET ORAL at 18:55

## 2021-10-14 RX ADMIN — ACETAMINOPHEN 975 MG: 325 TABLET ORAL at 23:52

## 2021-10-14 RX ADMIN — MIDAZOLAM 2 MG: 1 INJECTION INTRAMUSCULAR; INTRAVENOUS at 12:53

## 2021-10-14 RX ADMIN — METOPROLOL TARTRATE 25 MG: 25 TABLET, FILM COATED ORAL at 18:54

## 2021-10-14 RX ADMIN — LIDOCAINE HYDROCHLORIDE 80 MG: 20 INJECTION, SOLUTION EPIDURAL; INFILTRATION; INTRACAUDAL; PERINEURAL at 13:02

## 2021-10-14 RX ADMIN — ROCURONIUM BROMIDE 5 MG: 10 SOLUTION INTRAVENOUS at 13:02

## 2021-10-14 RX ADMIN — SUCCINYLCHOLINE CHLORIDE 120 MG: 20 INJECTION, SOLUTION INTRAMUSCULAR; INTRAVENOUS at 13:02

## 2021-10-14 RX ADMIN — DEXAMETHASONE SODIUM PHOSPHATE 8 MG: 4 INJECTION, SOLUTION INTRAMUSCULAR; INTRAVENOUS at 13:15

## 2021-10-14 RX ADMIN — FENTANYL CITRATE 25 MCG: 50 INJECTION, SOLUTION INTRAMUSCULAR; INTRAVENOUS at 12:53

## 2021-10-14 RX ADMIN — ACETAMINOPHEN 1000 MG: 500 TABLET ORAL at 11:26

## 2021-10-14 RX ADMIN — ONDANSETRON HYDROCHLORIDE 4 MG: 2 INJECTION, SOLUTION INTRAMUSCULAR; INTRAVENOUS at 13:15

## 2021-10-14 RX ADMIN — GABAPENTIN 300 MG: 300 CAPSULE ORAL at 23:52

## 2021-10-14 RX ADMIN — HYDRALAZINE HYDROCHLORIDE 10 MG: 20 INJECTION INTRAMUSCULAR; INTRAVENOUS at 15:38

## 2021-10-14 RX ADMIN — PROPOFOL 120 MG: 10 INJECTION, EMULSION INTRAVENOUS at 13:02

## 2021-10-14 RX ADMIN — SODIUM CHLORIDE 100 ML/HR: 9 INJECTION, SOLUTION INTRAVENOUS at 15:20

## 2021-10-14 RX ADMIN — CEFOXITIN SODIUM 2 G: 2 POWDER, FOR SOLUTION INTRAVENOUS at 13:09

## 2021-10-14 RX ADMIN — SODIUM CHLORIDE, POTASSIUM CHLORIDE, SODIUM LACTATE AND CALCIUM CHLORIDE 125 ML/HR: 600; 310; 30; 20 INJECTION, SOLUTION INTRAVENOUS at 11:25

## 2021-10-14 NOTE — INTERVAL H&P NOTE
Date of Surgery Update:  Gabby Atkins was seen and examined. History and physical has been reviewed. The patient has been examined.  There have been no significant clinical changes since the completion of the originally dated History and Physical.    Signed By: Viji Arizmendi MD     October 14, 2021 12:15 PM

## 2021-10-14 NOTE — DISCHARGE INSTRUCTIONS
[address] [facility] [address], [address]  P [phone]  F [phone]     Henna Schumacher      Dear Ms. Abiodun Ayush,      Please review your instructions with your nurse and ask any questions so you have all the information you need to recover well at home. If you do not feel you have everything you need to succeed and be safe after you leave the hospital, please discuss these concerns with your nurse. As always, call for any questions at home. Your doctor: Dr. Sanjiv Liu  Diagnosis: ENDOMETRIAL HYPERPLASIA  Procedure: Procedure(s):  TOTAL LAPAROSCOPIC HYSTERECTOMY, BILATERAL SALPINGO OOPHORECTOMY, SENTINEL NODE MAPPING  Date of Discharge: 10/15/21      Take Home Medications     See Discharge Medication Review provided to you by your nurse. If you did not receive one, request this prior to your discharge. · It is important that you take your medications as they are prescribed. Your prescriptions are sent to your pharmacy on record. · Keep your medications in the bottles provided by the pharmacist and keep a list of the medication names, dosages, times to be taken and what they are for in your wallet. · Do not take other medications without consulting your doctor. · You may take acetaminophen (Tylenol) and ibuprofen (Advil) for pain. If this is not sufficient for your pain, take tramadol or other pain medication you were provided. · You should take a daily gentle stool softener such as a colace pill or dulcolax suppository for constipation as this is not uncommon after surgery and/or while on pain medication. If not prescribed, this can be found over the counter. If constipation persists for >24 hours you should take a dose of Milk of Magnesia. Call if your constipation continues. Diet    · Stay hydrated and drink fluids such as gatorade and water. This will also help prevent constipation and dehydration.  Limit somewhat any usual caffeine intake of beverages such as soda, tea and coffee as this may serve to dehydrate you. · For the first 2-3 days keep a low fiber diet avoiding raw vegetables or fruits with skin. A diet consisting of soup, cereal, yogurt, eggs, fish, Boost/Ensure. Avoid fatty/greasy foods. · If nauseated, keep your diet limited to liquids and call if this persists. Activity    · If possible, have someone with you at all times until you feel stable. · Gradually increase your activity each day. There are generally no restrictions on walking, climbing stairs or riding in a car. Walk at least 4 times per day. Walking will help reduce the risk of blood clots and constipation. · Showers are okay. If you have an incision, no tub bathing/swimming for two weeks. · No driving for 2 week and/or while on pain medication. · The following restrictions apply:  1. No heavy lifting greater than 15 lbs for 4 weeks, then 25 lbs for the next 4 weeks. 2. If you had any vaginal procedure or a hysterectomy, nothing per vagina for 6-8 weeks. 3. You may experience vaginal spotting. Should the bleeding require more that 4 pads a day please call our office. Incision    · You should expect some discomfort in the area of your incision, particularly as you increase your activity. If you notice an area of increasing redness or new drainage, please call your doctor. · Your incisions will have buried, absorbable sutures, which do not need to be removed and are covered by protective glue. Please allow this to fall off on its own over time and refrain from peeling it off unless it is no longer covering the incision. Causes For Concern    If any of the following occur, please call our office and speak with the Nurse/aid who will help you with your problem or ask the doctor to call you.  Problems with the incision, including increasing pain, swelling, redness or drainage.     Inability to pass urine    Increasing abdominal pain despite medication   Persisting nausea or vomiting.  Fever or chills and a temperature >101F   Constipation (no bowel movement for three days).  Diarrhea (more than three watery stools within 24 hours).  Excessive vaginal or wound bleeding.  If after hours and you cannot reach an on-call physician, call 911. Follow-Up  Call [phone] to schedule the following appointments with Dr. Rock Ruelas:   Telephone virtual-visit in 10-14 days to discuss your pathology results.  In-office visit for your postoperative exam in 6 weeks from surgery. Information obtained by :  I understand that if any problems occur once I am at home I am to contact my physician. I understand and acknowledge receipt of the instructions indicated above.                                                                                                                                            Physician's or R.N.'s Signature                                                                  Date/Time                                                                                                                                              Patient or Representative Signature                                                          Date/Time

## 2021-10-14 NOTE — ROUTINE PROCESS
Patient: Katty Rodrigez MRN: [id number]  SSN: [id number]   YOB: 1955  Age: 73 y.o. Sex: female     Patient is status post Procedure(s):  TOTAL LAPAROSCOPIC HYSTERECTOMY, BILATERAL SALPINGO OOPHORECTOMY, SENTINEL NODE MAPPING.     Surgeon(s) and Role:     * Harpal Espana MD - Primary    Local/Dose/Irrigation: 30ML                  Peripheral IV 10/14/21 Left Hand (Active)            Airway - Endotracheal Tube 10/14/21 Oral (Active)       Airway - Endotracheal Tube 10/14/21 Oral (Active)                   Dressing/Packing:  Incision 10/14/21 Abdomen-Dressing/Treatment: Skin glue (10/14/21 1427)  Incision 10/14/21 Vagina-Dressing/Treatment: Karol Olp (10/14/21 1427)    Splint/Cast:  ]    Other:

## 2021-10-14 NOTE — OP NOTES
Gynecologic Oncology Operative Report    Placido Bustos  10/14/2021    PREOPERATIVE DIAGNOSIS:  1) Complex endometrial hyperplasia without atypia    POSTOPERATIVE DIAGNOSIS:  1) Complex endometrial hyperplasia without atypia    PROCEDURE:    Total laparoscopic hysterectomy with bilateral salpingo-oophorectomy, Bilateral sentinel lymph node mapping     Surgeon:  Олег Jaimes MD    Assistant:  Jose Robert surgical assistance was required throughout the case due to the complexity of the procedure. He assisted with dissection, development of the retroperitoneal spaces, and identification of pertinent anatomy during the procedure. Anesthesia:  General endotracheal anesthesia    EBL:  <10 cc     Preoperative antibiotics: Cefoxitan 2 grams IV    VTE Prophylaxis: SCDs and 5,000mg SQ heparin     Complications:  None    Implants: none    Specimens:  Uterus, cervix, bilateral fallopian tubes and ovaries, pelvic washings    Operative indications:  73 y.o. female with persistent PMB and complex endometrial hyperplasia without atypia. Operative findings: On laparoscopic survey, there was a loop of small bowel densely adherent to the anterior abdominal wall below the umbilicus, but this was not adherent to any other areas. We were able to work around this, as it was so densely adherent that I was concerned for an injury if I tried to take it down. Uterus was 10 weeks size. Normal appearing bilateral tubes and ovaries. Right and left sentinel lymph nodes both mapped along their respective external iliac vessels. Frozen pathology consistent with benign endometrium. Normal appearing liver edge and diaphragm. Significant intra-abdominal adiposity for which a 22-modifier will be added, in addition to the added time and difficulty related to the aforementioned adhesion.      Operative note:  After the risks, benefits, indications, and alternatives of the procedure were discussed with the patient and informed consent was obtained, the patient was taken to the operating room. She was positively identified, administered general anesthesia, and then placed in the dorsal lithotomy position in [address] [place] [address]. An exam under anesthesia was performed with the above findings. She was then prepped and draped in the usual fashion. A jhaveri catheter was inserted. A speculum was placed in the vagina and the anterior lip of the cervix was grasped with a tenaculum and then dilated. The cervix was injected with 3-cc of indigocynanine green at 3- and 9-o'clock. Then a V-care uterine manipulator was placed without difficulty. A 97BP umbilical incision was then made with #15-blade and carried down to the underlying fascia. The fascia was incised and the peritoneal cavity entered via an open Monica technique. 10-mm balloon trocar was then placed and intra-peritoneal placement confirmed via direct visualization. The abdomen was then insufflated with CO2 to a pressure of 15mmHg. The abdomen was surveyed with the above findings. Bilateral right and left lower quadrant 5-mm trocars were then inserted under direct visualization. The patient was placed in Trendelenburg and pelvic washings were obtained. As noted above, special care had to be taken while operating around the aforementioned adherent loop of bowel. Attention was then turned to the pelvis. Bilateral round ligaments were sealed and divided with the EnSeal device and the bilateral pelvic side-wall retroperitoneum entered and developed. Bilateral ureters were identified and preserved. Bilateral superior vesical arteries, obturator nerves, and pelvic vasculature was identified and preserved. [place] lymph node mapping identified the above sentinel lymph nodes. These were not removed at this time, as the need to remove them needed to be based on frozen pathology. Bilateral infundibulopelvic ligaments were skeletonized, then sealed and divided with the EnSeal device.  Dissection was continued inferiorly along the broad ligament and the bladder dissected off the lower uterine segment and cervix. Bilateral uterine arteries were skeletonized, then sealed and divided with the EnSeal device. A circumferential colpotomy was then carried along the V-care. The specimen was then removed via the vagina and sent for frozen pathology. Frozen pathology consistent benign endometrium with progestational effect. The vaginal colpotomy was closed using the EndoStitch with 0-Vicryl V-lock suture. The pelvis was irrigated and made hemostatic. The intra-abdominal pressure was then decreased and all planes of dissection were confirmed hemostatic. Fibrillar was placed along all planes of dissection. The insufflation was released prior to removal of all port sites, then all trocars were removed. The umbilical fascia was reapproximated with a figure-of-8 stitch using 0-Vicryl on a UR-6 needle. All skin incisions were closed with 4-0 monocryl subcuticular stitch. Skin glue was applied to all skin incisions. The vagina was inspected with an intact vaginal cuff and no evidence of lacerations. The patient was taken out of stirrups, awakened from anesthesia, and taken to the recovery room in stable condition. The patient tolerated the procedure well. All instrument, sponge, and needle counts were correct.         Karo Padron MD  10/14/2021  2:47 PM

## 2021-10-14 NOTE — ANESTHESIA POSTPROCEDURE EVALUATION
Procedure(s):  TOTAL LAPAROSCOPIC HYSTERECTOMY, BILATERAL SALPINGO OOPHORECTOMY, SENTINEL NODE MAPPING. general    Anesthesia Post Evaluation        Level of consciousness: awake  Pain management: adequate  Airway patency: patent  Anesthetic complications: no  Cardiovascular status: hemodynamically stable  Respiratory status: acceptable  Hydration status: acceptable  Comments: The patient is ready for PACU discharge. Wendy Vásquez DO                   Post anesthesia nausea and vomiting:  controlled      INITIAL Post-op Vital signs:   Vitals Value Taken Time   /87 10/14/21 1515   Temp 36.6 °C (97.9 °F) 10/14/21 1456   Pulse 86 10/14/21 1517   Resp 16 10/14/21 1517   SpO2 96 % 10/14/21 1517   Vitals shown include unvalidated device data.

## 2021-10-14 NOTE — ANESTHESIA PREPROCEDURE EVALUATION
Relevant Problems   No relevant active problems       Anesthetic History   No history of anesthetic complications            Review of Systems / Medical History  Patient summary reviewed, nursing notes reviewed and pertinent labs reviewed    Pulmonary  Within defined limits      Sleep apnea           Neuro/Psych         Psychiatric history     Cardiovascular  Within defined limits  Hypertension                   GI/Hepatic/Renal  Within defined limits              Endo/Other        Obesity and arthritis     Other Findings              Physical Exam    Airway  Mallampati: II  TM Distance: > 6 cm  Neck ROM: normal range of motion   Mouth opening: Normal     Cardiovascular  Regular rate and rhythm,  S1 and S2 normal,  no murmur, click, rub, or gallop             Dental    Dentition: Edentulous     Pulmonary  Breath sounds clear to auscultation               Abdominal  GI exam deferred       Other Findings            Anesthetic Plan    ASA: 2  Anesthesia type: general          Induction: Intravenous  Anesthetic plan and risks discussed with: Patient

## 2021-10-14 NOTE — PERIOP NOTES
FIBRILLAR WAS GIVEN TO THE STERILE FIELD TO BE USED BY MD DURING PROCEDURE  REF: [id number]  LOT: [id number]  EXP: 01/31/2024

## 2021-10-14 NOTE — BRIEF OP NOTE
Brief Postoperative Note    Patient: Charlie Argueta  YOB: 1955  MRN: [id number]    Date of Procedure: 10/14/2021     Pre-Op Diagnosis: ENDOMETRIAL HYPERPLASIA    Post-Op Diagnosis: Same as preoperative diagnosis. Procedure(s):  TOTAL LAPAROSCOPIC HYSTERECTOMY, BILATERAL SALPINGO OOPHORECTOMY, SENTINEL NODE MAPPING    Surgeon(s): Maddy Chapman MD    Surgical Assistant: None    Anesthesia: General     Estimated Blood Loss (mL): Minimal    Complications: None    Specimens:   ID Type Source Tests Collected by Time Destination   1 : UTERUS CERVIX BILATERAL TUBES AND OVARIES Frozen Section Uterus with Bilateral Fallopian tubes and Ovaries  Maddy Chapman MD 10/14/2021 1410 Pathology   1 : PELVIC WASHINGS Fresh Pelvis  Maddy Chapman MD 10/14/2021 1335 Cytology        Implants: * No implants in log *    Drains: * No LDAs found *    Findings: On laparoscopic survey, there was a loop of small bowel densely adherent to the anterior abdominal wall below the umbilicus, but this was not adherent to any other areas. We were able to work around this, as it was so densely adherent that I was concerned for an injury if I tried to take it down. Uterus was 10 weeks size. Normal appearing bilateral tubes and ovaries. Right and left sentinel lymph nodes both mapped along their respective external iliac vessels. Frozen pathology consistent with benign endometrium. Normal appearing liver edge and diaphragm. Significant intra-abdominal adiposity for which a 22-modifier will be added, in addition to the added time and difficulty related to the aforementioned adhesion.      Electronically Signed by Kylah Frazier MD on 10/14/2021 at 2:42 PM

## 2021-10-15 VITALS
TEMPERATURE: 98.9 F | BODY MASS INDEX: 36.77 KG/M2 | HEIGHT: 65 IN | SYSTOLIC BLOOD PRESSURE: 171 MMHG | OXYGEN SATURATION: 94 % | HEART RATE: 82 BPM | RESPIRATION RATE: 16 BRPM | WEIGHT: 220.68 LBS | DIASTOLIC BLOOD PRESSURE: 78 MMHG

## 2021-10-15 LAB
ANION GAP SERPL CALC-SCNC: 5 MMOL/L (ref 5–15)
BUN SERPL-MCNC: 10 MG/DL (ref 6–20)
BUN/CREAT SERPL: 9 (ref 12–20)
CALCIUM SERPL-MCNC: 9.1 MG/DL (ref 8.5–10.1)
CHLORIDE SERPL-SCNC: 107 MMOL/L (ref 97–108)
CO2 SERPL-SCNC: 25 MMOL/L (ref 21–32)
CREAT SERPL-MCNC: 1.09 MG/DL (ref 0.55–1.02)
ERYTHROCYTE [DISTWIDTH] IN BLOOD BY AUTOMATED COUNT: 13.3 % (ref 11.5–14.5)
GLUCOSE SERPL-MCNC: 114 MG/DL (ref 65–100)
HCT VFR BLD AUTO: 38.6 % (ref 35–47)
HGB BLD-MCNC: 12.5 G/DL (ref 11.5–16)
MCH RBC QN AUTO: 28.9 PG (ref 26–34)
MCHC RBC AUTO-ENTMCNC: 32.4 G/DL (ref 30–36.5)
MCV RBC AUTO: 89.4 FL (ref 80–99)
NRBC # BLD: 0 K/UL (ref 0–0.01)
NRBC BLD-RTO: 0 PER 100 WBC
PLATELET # BLD AUTO: 219 K/UL (ref 150–400)
PMV BLD AUTO: 11.7 FL (ref 8.9–12.9)
POTASSIUM SERPL-SCNC: 4 MMOL/L (ref 3.5–5.1)
RBC # BLD AUTO: 4.32 M/UL (ref 3.8–5.2)
SODIUM SERPL-SCNC: 137 MMOL/L (ref 136–145)
WBC # BLD AUTO: 12 K/UL (ref 3.6–11)

## 2021-10-15 PROCEDURE — 85027 COMPLETE CBC AUTOMATED: CPT

## 2021-10-15 PROCEDURE — 74011250637 HC RX REV CODE- 250/637: Performed by: PHYSICIAN ASSISTANT

## 2021-10-15 PROCEDURE — 36415 COLL VENOUS BLD VENIPUNCTURE: CPT

## 2021-10-15 PROCEDURE — 99218 HC RM OBSERVATION: CPT

## 2021-10-15 PROCEDURE — 80048 BASIC METABOLIC PNL TOTAL CA: CPT

## 2021-10-15 RX ORDER — TRAMADOL HYDROCHLORIDE 50 MG/1
50 TABLET ORAL
Qty: 20 TABLET | Refills: 0 | Status: SHIPPED | OUTPATIENT
Start: 2021-10-15 | End: 2021-10-20

## 2021-10-15 RX ORDER — DOCUSATE SODIUM 100 MG/1
100 CAPSULE, LIQUID FILLED ORAL
Qty: 30 CAPSULE | Refills: 0 | Status: SHIPPED | OUTPATIENT
Start: 2021-10-15 | End: 2021-11-14

## 2021-10-15 RX ADMIN — METOPROLOL TARTRATE 25 MG: 25 TABLET, FILM COATED ORAL at 08:56

## 2021-10-15 RX ADMIN — LISINOPRIL 2.5 MG: 5 TABLET ORAL at 08:56

## 2021-10-15 RX ADMIN — DOCUSATE SODIUM 200 MG: 100 CAPSULE, LIQUID FILLED ORAL at 08:57

## 2021-10-15 RX ADMIN — GABAPENTIN 300 MG: 300 CAPSULE ORAL at 08:57

## 2021-10-15 RX ADMIN — ACETAMINOPHEN 975 MG: 325 TABLET ORAL at 06:40

## 2021-10-15 NOTE — PROGRESS NOTES
[facility]  [address] [place] [address], [phone]  P [phone]  F [phone]       Patient Name: Radha Bowen   Admit Date: 10/14/2021   OR Date: 10/14/2021   Visit Date: 10/15/2021        SUBJECTIVE:    Doing well this morning. No complaints. States that pain is well controlled with tramadol. Tolerating regular diet. No nausea/vomiting.      OBJECTIVE:    Patient Vitals for the past 24 hrs:   Temp Pulse Resp BP SpO2   10/15/21 0853 98.9 °F (37.2 °C) 82 16 (!) 171/78 94 %   10/15/21 0450 98.8 °F (37.1 °C) 64 16 (!) 149/80 99 %   10/15/21 0050 98.8 °F (37.1 °C) 72 16 (!) 141/70 99 %   10/14/21 2050 98.1 °F (36.7 °C) 61 18 (!) 147/72 97 %   10/14/21 1950 98.2 °F (36.8 °C) 92 18 (!) 148/82 98 %   10/14/21 1854 98.1 °F (36.7 °C) 94 18 (!) 145/78 98 %   10/14/21 1706 98.2 °F (36.8 °C) 94 18 (!) 162/80 98 %   10/14/21 1626 98.5 °F (36.9 °C)   (!) 157/78    10/14/21 1615  90 19 (!) 157/78 90 %   10/14/21 1600  92 19 (!) 155/85 91 %   10/14/21 1552 98.5 °F (36.9 °C) 84 17  94 %   10/14/21 1545  84 17 (!) 161/93 98 %   10/14/21 1538  81  (!) 161/93    10/14/21 1530  87 19 (!) 161/93 97 %   10/14/21 1515  98 13 (!) 167/87 96 %   10/14/21 1510  85 14 (!) 161/79 96 %   10/14/21 1508     97 %   10/14/21 1505  89 18 (!) 162/102 96 %   10/14/21 1500  91 17 (!) 171/121 95 %   10/14/21 1456 97.9 °F (36.6 °C) 89 18 (!) 173/98 (!) 88 %   10/14/21 1454  81 17  (!) 89 %   10/14/21 1452 97.9 °F (36.6 °C) 90 15 (!) 168/88 92 %   10/14/21 1110 99.1 °F (37.3 °C) (!) 59 18 (!) 143/88 97 %       Date 10/14/21 0700 - 10/15/21 0659 10/15/21 0700 - 10/16/21 0659   Shift 1599-6177 7528-3241 24 Hour Total 8620-8475 6476-5042 24 Hour Total   INTAKE   I.V.(mL/kg/hr) 900(0.7)  900(0.4)        I.V. 100  100        Volume (lactated Ringers infusion) 800  800      Shift Total(mL/kg) 900(9)  900(9)      OUTPUT   Urine(mL/kg/hr) 425(0.4) 750(0.6) 1175(0.5)        Urine Voided  0 0 Urine Output (mL) ([REMOVED] Urinary Catheter 10/14/21 Nguyen; 2- way)       Blood 50  50        Estimated Blood Loss 50  50      Shift Total(mL/kg) 475(4.7) 750(7.5) 1225(12.2)       -750 -325      Weight (kg) 100.1 100.1 100.1 100.1 100.1 100.1       Physical Exam     General:  alert, cooperative, no distress     Cardiac:  Regular rate and rhythm        Lungs:  unlabored  Abdomen:  soft, mildly distended. nontender       Wound:  clean, dry   Extremity: extremities normal, atraumatic, no cyanosis or edema    Data Review  Lab Results   Component Value Date/Time    WBC 12.0 (H) 10/15/2021 04:25 AM    ABS. NEUTROPHILS 3.6 10/11/2021 11:32 AM    HGB 12.5 10/15/2021 04:25 AM    HCT 38.6 10/15/2021 04:25 AM    MCV 89.4 10/15/2021 04:25 AM    MCH 28.9 10/15/2021 04:25 AM    PLATELET 461 32/35/5024 04:25 AM     Lab Results   Component Value Date/Time    Sodium 137 10/15/2021 04:25 AM    Potassium 4.0 10/15/2021 04:25 AM    Chloride 107 10/15/2021 04:25 AM    CO2 25 10/15/2021 04:25 AM    Glucose 114 (H) 10/15/2021 04:25 AM    BUN 10 10/15/2021 04:25 AM    Creatinine 1.09 (H) 10/15/2021 04:25 AM    Calcium 9.1 10/15/2021 04:25 AM    Albumin 3.4 (L) 10/11/2021 11:32 AM    Bilirubin, total 0.6 10/11/2021 11:32 AM    ALT (SGPT) 17 10/11/2021 11:32 AM    Alk. phosphatase 57 10/11/2021 11:32 AM     IMPRESSION/PLAN:    1 Day Post-Op Procedure(s):  TOTAL LAPAROSCOPIC HYSTERECTOMY, BILATERAL SALPINGO OOPHORECTOMY, SENTINEL NODE MAPPING for ENDOMETRIAL HYPERPLASIA    Oncologic:  73 y.o. female with persistent PMB and complex endometrial hyperplasia without atypia. Heme/CV:  Hgb 12.5 this morning. Hemodynamically stable. VSS   Renal:  creatinine 1.09. improved compared to preop. Encourage PO fluids. Nguyen out. Waiting to void. FEN/GI:  no nausea. Tolerating regular diet. No flatus   ID/Wound:  afebrile. Incisions and abdomen benign   PPX:  SCDs. Incentive spirometer.   Continue OOB   Dispostion:  Doing well postoperatively. Continue routine post op care. Discharge instructions reviewed. Questions answered. [id number] Ana Luisa Ortiz for discharge once able to void. To follow up with Dr. Vahid German in 2 weeks.         Jaylen Fisher PA-C  10/15/2021  8:55 AM

## 2021-10-15 NOTE — PROGRESS NOTES
Observation notice provided in writing to patient and/or caregiver as well as verbal explanation of the policy. Patients who are in outpatient status also receive the Observation notice. Patient has received notice and or patient representative has received via secure email, fax, or certified mail based on patient representative's preference.      Lu Barba MS

## 2021-10-15 NOTE — PROGRESS NOTES
4552 Verbal shift change report given to Lizzette Rehman RN (oncoming nurse) by Niki Roberts RN (offgoing nurse). Report included the following information SBAR, Kardex, Procedure Summary, Intake/Output, MAR, Accordion, Recent Results and Med Rec Status.

## 2021-10-15 NOTE — PROGRESS NOTES
1930: Bedside shift change report given to Ela Cid RN (oncoming nurse) by Boni Martinez RN (offgoing nurse). Report included the following information SBAR, Kardex, OR Summary, Procedure Summary, Intake/Output, MAR and Recent Results.

## 2021-11-16 NOTE — PROGRESS NOTES
Tata Carrasquillo,     Ms. Charlie Ellie has not made a postoperative appointment yet. She needs a 6 week postop appointment.      Thanks,     Kylah Frazier MD

## 2021-11-29 NOTE — PROGRESS NOTES
Post op Visit, surgery was on 10/14/2021    1. Have you been to the ER, urgent care clinic since your last visit? Hospitalized since your last visit? Yes, surgery with Dr. Oksana Cuba on 10/14/2021    2. Have you seen or consulted any other health care providers outside of the [address] [place] [address] since your last visit? Include any pap smears or colon screening.    no

## 2021-11-30 ENCOUNTER — OFFICE VISIT (OUTPATIENT)
Dept: GYNECOLOGY | Age: 66
End: 2021-11-30
Payer: MEDICARE

## 2021-11-30 VITALS
SYSTOLIC BLOOD PRESSURE: 154 MMHG | HEART RATE: 67 BPM | DIASTOLIC BLOOD PRESSURE: 87 MMHG | HEIGHT: 65 IN | BODY MASS INDEX: 36.65 KG/M2 | WEIGHT: 220 LBS

## 2021-11-30 DIAGNOSIS — N85.01 COMPLEX ENDOMETRIAL HYPERPLASIA WITHOUT ATYPIA: Primary | ICD-10-CM

## 2021-11-30 PROCEDURE — 99024 POSTOP FOLLOW-UP VISIT: CPT | Performed by: OBSTETRICS & GYNECOLOGY

## 2021-11-30 NOTE — PROGRESS NOTES
[address] [facility] [address], [address] [facility]  P [phone]  F [phone]    Office Note  Patient ID:  Name:  Roya Mcleod  MRN:  [id number]  :  1955/66 y.o. Date:  2021      HISTORY OF PRESENT ILLNESS:  Ms. Roya Mcleod is a 73 y.o. female who initially presented with PMB and endometrial hyperplasia who on 10/14/2021 underwent Total laparoscopic hysterectomy with bilateral salpingo-oophorectomy, Bilateral sentinel lymph node mapping. Final pathology consistent with benign endometrium with progesterone effect. 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 [facility] today for postoperative visit. Doing well without complaints. Initial History:  Ms. Roya Mcleod is a 73 y.o.  postmenopausal female who presents as a new patient from Dr. Evangelina Serrano for postmenopausal bleeding. The patient is a poor historian, and the majority of her history is pieced together through her chart. The patient reports vaginal spotting about 2 months ago. Upon further review she reports having the same problem last year. She underwent a D&C in  with Dr. Rose Jeffrey, which demonstrated \"complex hyperplasia without atypia arisign within endometrial polyp\". She was started on progesterone therapy at that time. She ultimately stopped this therapy at some point, although that is unclear. She subsequently underwent a repeat endometrial biopsy in 2020 with Dr. Evangelina Serrano which was negative for dysplasia. Given the continued vaginal spotting/bleeding, she underwent another EMB with Dr. Evangelina Serrano on 2021 which demonstrated \"extremely scan endometrial tissue in a background of clotted blood\". She was subsequently referred to our office for further management. Continues to report spotting. Denies pelvic or abdominal pain. Denies CP or SOB. Denies urinary symptoms. Pertinent PMH/PSH: HTN, Obesity, complex endometrial hyperplasia without atypia in 2020. Active, no restrictions. Pathology Review:   10/14/2021:  * * *FINAL PATHOLOGIC DIAGNOSIS* * *        Uterus, cervix, bilateral fallopian tubes and ovaries; total   hysterectomy and bilateral salpingo-oophorectomy:        Uterus and cervix:   Endometrium with cystic atrophic glands and stromal decidualization,   findings compatible with hormonal/progestin effect; no atypia and no   malignancy identified. Adenomyosis. Leiomyomata (up to 2.8 cm in greatest dimension), with foci of ischemia   and hyaline degeneration. Benign ectocervical and endocervical stroma with squamous metaplasia and   nabothian cysts. Left ovary and fallopian tube:   Benign ovary with incidental small benign Carolina tumor (0.6 cm) and   epithelial inclusion cysts. Benign fallopian tube with no significant histopathologic abnormality. Right ovary and fallopian tube:   Benign ovary with epithelial inclusion cysts and small calcified fibrous   nodule. No gross or microscopic evidence of left fallopian tube identified. 9/16/2021:  * * *FINAL PATHOLOGIC DIAGNOSIS* * *   Endometrium, biopsy:        Extremely scant endometrial tissue in a background of clotted blood   (see comment). * * *Comment* * *   There is insufficient endometrial tissue for evaluation.  Recommend   sampling, as clinically indicated. 2/28/2020:  * * *FINAL PATHOLOGIC DIAGNOSIS* * *  Endometrium, curetting:        Complex hyperplasia without atypia arising within endometrial polyp        ROS:  A comprehensive review of systems was negative except for that written in the History of Present Illness.  , 10 point ROS    OB/GYN ROS:  Per HPI    ECOG ndGndrndanddndend:nd nd2nd Problem List:  Patient Active Problem List    Diagnosis Date Noted    Postmenopausal bleeding 10/14/2021    PMB (postmenopausal bleeding) 10/04/2021    Complex endometrial hyperplasia without atypia 10/04/2021    Sleep apnea     Morbid obesity ([facility] [address]     High cholesterol     Depression     Severe obesity with body mass index (BMI) of 35.0 to 39.9 with serious comorbidity ([facility] [address] 09/19/2018    HTN (hypertension) 09/19/2018     PMH:  Past Medical History:   Diagnosis Date    Arthritis     OSTEO- GENERALIZED    Depression     Endometrial hyperplasia 10/2021    High cholesterol     HTN (hypertension)     Morbid obesity ([facility] [address]     Psychiatric disorder     Seasonal allergies     Sleep apnea     USE CPAP      PSH:  Past Surgical History:   Procedure Laterality Date    HX COLONOSCOPY      HX KNEE ARTHROSCOPY Right 2010    HX OOPHORECTOMY  1990's    HX TUBAL LIGATION  1982    KNEE ARTHROSCP HARV      LIGATE FALLOPIAN TUBE        Social History:  Social History     Tobacco Use    Smoking status: Former Smoker     Packs/day: 1.00     Years: 20.00     Pack years: 20.00     Quit date: 2006     Years since quitting: 15.9    Smokeless tobacco: Never Used   Substance Use Topics    Alcohol use: Yes     Comment: RARE      Family History:  Family History   Problem Relation Age of Onset    Hypertension Mother     Heart Disease Mother     Diabetes Mother     Breast Cancer Mother     No Known Problems Father     No Known Problems Sister     Pneumonia Brother     No Known Problems Daughter     No Known Problems Son     Anesth Problems Neg Hx       Medications: (reviewed)  Current Outpatient Medications   Medication Sig    acetaminophen (Tylenol Extra Strength) 500 mg tablet Take 500 mg by mouth every six (6) hours as needed for Pain.  metoprolol tartrate (LOPRESSOR) 25 mg tablet Take 25 mg by mouth two (2) times a day.  ibuprofen (MOTRIN) 600 mg tablet TAKE 1 TABLET BY MOUTH EVERY 6 HOURS AS NEEDED FOR PAIN    aspirin delayed-release 81 mg tablet Take  by mouth every morning.  pravastatin (PRAVACHOL) 40 mg tablet Take 40 mg by mouth nightly.  gabapentin (NEURONTIN) 300 mg capsule Take 300 mg by mouth three (3) times daily.  lisinopril (PRINIVIL, ZESTRIL) 2.5 mg tablet Take 2.5 mg by mouth two (2) times a day.  medroxyPROGESTERone (PROVERA) 10 mg tablet TAKE 2 TABS BY MOUTH DAILY (Patient not taking: Reported on 11/30/2021)     No current facility-administered medications for this visit. Allergies: (reviewed)  No Known Allergies     Gyn History:   Last pap: 1/2020 normal  History of abnormal pap: denies    OBJECTIVE:  Physical Exam:  Visit Vitals  BP (!) 154/87 (BP 1 Location: Left arm, BP Patient Position: Sitting)   Pulse 67   Ht 5' 4.5\" (1.638 m)   Wt 220 lb (99.8 kg)   BMI 37.18 kg/m²      General: Alert and oriented. No acute distress. Well-nourished  Gastrointestinal: soft, non-tender, non-distended, no masses or organomegaly. Well-healed incision. Musculoskeletal: normal gait. No joint tenderness, deformity or swelling. No muscular tenderness. Extremities: extremities normal, atraumatic, no cyanosis or edema. Pelvic: exam chaperoned by nurse. Normal appearing external genitalia. On speculum exam, the vaginal cuff is intact and healing well. On bimanual, the uterus and cervix are surgically absent. Vaginal cuff intact without defect. No evidence of masses or nodularity on bimanual exam. Deferred rectovaginal exam.   Neuro: Grossly intact. Normal gait and movement. No acute deficit  Skin: No evidence of rashes or skin changes. IMPRESSION/PLAN:    Ms. Teressa Patrick is a 73 y.o. female who initially presented with PMB and endometrial hyperplasia who on 10/14/2021 underwent Total laparoscopic hysterectomy with bilateral salpingo-oophorectomy, Bilateral sentinel lymph node mapping. Final pathology consistent with benign endometrium with progesterone effect. 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      Problems:     Patient Active Problem List    Diagnosis Date Noted    Postmenopausal bleeding 10/14/2021    PMB (postmenopausal bleeding) 10/04/2021    Complex endometrial hyperplasia without atypia 10/04/2021    Sleep apnea     Morbid obesity ([address]     High cholesterol     Depression     Severe obesity with body mass index (BMI) of 35.0 to 39.9 with serious comorbidity ([facility] [address] 09/19/2018    HTN (hypertension) 09/19/2018     Reviewed patient's course to date, including her benign surgical pathology. She has healed well from surgery. Given her benign pathology, the patient may be discharged from Gynecologic Oncology clinic. Encouraged patient to follow-up with her PCP and ObGyn for continued routine health care maintenance. All questions and concerns were addressed with the patient and she is comfortable with the plan. An electronic signature was used to authenticate this note.      James Anguiano MD

## 2021-12-10 ENCOUNTER — HOSPITAL ENCOUNTER (OUTPATIENT)
Dept: MAMMOGRAPHY | Age: 66
Discharge: HOME | End: 2021-12-10
Attending: FAMILY MEDICINE
Payer: MEDICARE

## 2021-12-10 DIAGNOSIS — Z12.31 BREAST CANCER SCREENING BY MAMMOGRAM: ICD-10-CM

## 2021-12-10 PROCEDURE — 77067 SCR MAMMO BI INCL CAD: CPT

## 2022-03-19 PROBLEM — N95.0 PMB (POSTMENOPAUSAL BLEEDING): Status: ACTIVE | Noted: 2021-10-04

## 2022-03-19 PROBLEM — E66.01 SEVERE OBESITY WITH BODY MASS INDEX (BMI) OF 35.0 TO 39.9 WITH SERIOUS COMORBIDITY (HCC): Status: ACTIVE | Noted: 2018-09-19

## 2022-03-19 PROBLEM — N85.01 COMPLEX ENDOMETRIAL HYPERPLASIA WITHOUT ATYPIA: Status: ACTIVE | Noted: 2021-10-04

## 2022-03-19 PROBLEM — N95.0 POSTMENOPAUSAL BLEEDING: Status: ACTIVE | Noted: 2021-10-14

## 2022-03-20 PROBLEM — I10 HTN (HYPERTENSION): Status: ACTIVE | Noted: 2018-09-19

## 2023-08-22 ENCOUNTER — TRANSCRIBE ORDERS (OUTPATIENT)
Facility: HOSPITAL | Age: 68
End: 2023-08-22

## 2023-08-22 DIAGNOSIS — Z12.31 SCREENING MAMMOGRAM FOR HIGH-RISK PATIENT: Primary | ICD-10-CM

## 2023-08-23 ENCOUNTER — HOSPITAL ENCOUNTER (OUTPATIENT)
Facility: HOSPITAL | Age: 68
Discharge: HOME | End: 2023-08-26
Payer: MEDICARE

## 2023-08-23 VITALS — WEIGHT: 211 LBS | BODY MASS INDEX: 38.83 KG/M2 | HEIGHT: 62 IN

## 2023-08-23 DIAGNOSIS — Z12.31 SCREENING MAMMOGRAM FOR HIGH-RISK PATIENT: ICD-10-CM

## 2023-08-23 PROCEDURE — 77063 BREAST TOMOSYNTHESIS BI: CPT

## 2024-03-04 NOTE — MR AVS SNAPSHOT
----- Message from Pako Morocho RPH sent at 2019  8:47 AM CDT -----  Regarding:  Therapy Plan  Aurea    Abbey Hein needs a new therapy plan for B12.  Therapy plans are good for one year from the date created, this was created on 2018. I have discontinued the  therapy plan. This patient is scheduled to come in on  so a new plan will have to be placed before then if MD would like to continue.     Thanks,    Pako Morocho RPH     [address] [id number] 
[phone] Patient: Vincent Cogan MRN: [id number] Visit Information Date & Time Provider Department Dept. Phone Encounter #  
 2018  4:00 PM Jean Guzmán MD [address] [address] [id number] Upcoming Health Maintenance Date Due Hepatitis C Screening 1955 DTaP/Tdap/Td series (1 - Tdap) 1977 PAP AKA CERVICAL CYTOLOGY 1977 BREAST CANCER SCRN MAMMOGRAM 2006 FOBT Q 1 YEAR AGE 50-75 2006 ZOSTER VACCINE AGE 60> 2015 Influenza Age 5 to Adult 2018 MEDICARE YEARLY EXAM 2018 Allergies as of 2018  Review Complete On: 2018 By: Jean Guzmán MD  
 No Known Allergies Current Immunizations  Never Reviewed No immunizations on file. Not reviewed this visit Vitals BP Pulse Height(growth percentile) Weight(growth percentile) SpO2 BMI  
 149/82 (BP 1 Location: Left arm, BP Patient Position: Sitting) 75 5' 6\" (1.676 m) 226 lb (102.5 kg) 95% 36.48 kg/m2 OB Status Smoking Status Postmenopausal Former Smoker Vitals History BMI and BSA Data Body Mass Index Body Surface Area  
 36.48 kg/m 2 2.18 m 2 Your Updated Medication List  
  
   
This list is accurate as of 18  5:15 PM.  Always use your most recent med list.  
  
  
  
  
 aspirin delayed-release 81 mg tablet Take  by mouth daily. CLARITIN 10 mg tablet Generic drug:  loratadine Take 10 mg by mouth daily. diazePAM 5 mg tablet Commonly known as:  VALIUM Take 1 Tab by mouth three (3) times daily as needed for Anxiety (spasm). gabapentin 300 mg capsule Commonly known as:  NEURONTIN Take 300 mg by mouth three (3) times daily. * ibuprofen 800 mg tablet Commonly known as:  MOTRIN Take  by mouth every six (6) hours as needed. Pt discharge instructions, follow up and rx x 3 reviewed with pt. Pt verbalized understanding. No further needs. Pt discharged at this time.      * ibuprofen 400 mg tablet Commonly known as:  MOTRIN Take 1 Tab by mouth every six (6) hours as needed for Pain. lisinopril 2.5 mg tablet Commonly known as:  Robertha Roup Take  by mouth daily. metoprolol tartrate 25 mg tablet Commonly known as:  LOPRESSOR Take  by mouth two (2) times a day. OTHER(NON-FORMULARY) HTN medication but can't remember OTHER(NON-FORMULARY) Medication for cholesterol can't remember  
  
 oxyCODONE-acetaminophen 5-325 mg per tablet Commonly known as:  PERCOCET Take 1 Tab by mouth every four (4) hours as needed for Pain for 20 doses. pravastatin 40 mg tablet Commonly known as:  PRAVACHOL Take 40 mg by mouth nightly. traMADol 50 mg tablet Commonly known as:  ULTRAM  
Take 50 mg by mouth every six (6) hours as needed for Pain. * Notice: This list has 2 medication(s) that are the same as other medications prescribed for you. Read the directions carefully, and ask your doctor or other care provider to review them with you. Patient Instructions 217 [facility], [place]. [address] [place] [address] Tel.  [phone] Fax. 100 [facility] [address] Tel.  [phone] Fax. [phone] [address] [address] Tel.  [phone] Fax. [phone] Sleep Apnea: After Your Visit Your Care Instructions Sleep apnea occurs when you frequently stop breathing for 10 seconds or longer during sleep. It can be mild to severe, based on the number of times per hour that you stop breathing or have slowed breathing. Blocked or narrowed airways in your nose, mouth, or throat can cause sleep apnea. Your airway can become blocked when your throat muscles and tongue relax during sleep. Sleep apnea is common, occurring in 1 out of 20 individuals.   Individuals having any of the following characteristics should be evaluated and treated right away due to high risk and detrimental consequences from untreated sleep apnea: 
1. Obesity 2. Congestive Heart failure 3. Atrial Fibrillation 4. Uncontrolled Hypertension 5. Type II Diabetes 6. Night-time Arrhythmias 7. Stroke 8. Pulmonary Hypertension 9. High-risk Driving Populations (pilots, truck drivers, etc.) 10. Patients Considering Weight-loss Surgery How do you know you have sleep apnea? You probably have sleep apnea if you answer 'yes' to 3 or more of the following questions: S - Have you been told that you Snore? T - Are you often Tired during the day? O - Has anyone Observed you stop breathing while sleeping? P- Do you have (or are being treated for) high blood Pressure? B - Are you obese (Body Mass Index > 35)? A - Is your Age 48years old or older? N - Is your Neck size greater than 16 inches? G - Are you male Gender? A sleep physician can prescribe a breathing device that prevents tissues in the throat from blocking your airway. Or your doctor may recommend using a dental device (oral breathing device) to help keep your airway open. In some cases, surgery may be needed to remove enlarged tissues in the throat. Follow-up care is a key part of your treatment and safety. Be sure to make and go to all appointments, and call your doctor if you are having problems. It's also a good idea to know your test results and keep a list of the medicines you take. How can you care for yourself at home? · Lose weight, if needed. It may reduce the number of times you stop breathing or have slowed breathing. · Go to bed at the same time every night. · Sleep on your side. It may stop mild apnea. If you tend to roll onto your back, sew a pocket in the back of your Vaccinogen top. Put a tennis ball into the pocket, and stitch the pocket shut. This will help keep you from sleeping on your back. · Avoid alcohol and medicines such as sleeping pills and sedatives before bed. · Do not smoke. Smoking can make sleep apnea worse. If you need help quitting, talk to your doctor about stop-smoking programs and medicines. These can increase your chances of quitting for good. · Prop up the head of your bed 4 to 6 inches by putting bricks under the legs of the bed. · Treat breathing problems, such as a stuffy nose, caused by a cold or allergies. · Use a continuous positive airway pressure (CPAP) breathing machine if lifestyle changes do not help your apnea and your doctor recommends it. The machine keeps your airway from closing when you sleep. · If CPAP does not help you, ask your doctor whether you should try other breathing machines. A bilevel positive airway pressure machine has two types of air pressureâone for breathing in and one for breathing out. Another device raises or lowers air pressure as needed while you breathe. · If your nose feels dry or bleeds when using one of these machines, talk with your doctor about increasing moisture in the air. A humidifier may help. · If your nose is runny or stuffy from using a breathing machine, talk with your doctor about using decongestants or a corticosteroid nasal spray. When should you call for help? Watch closely for changes in your health, and be sure to contact your doctor if: 
· You still have sleep apnea even though you have made lifestyle changes. · You are thinking of trying a device such as CPAP. · You are having problems using a CPAP or similar machine. Where can you learn more? Go to LawBite.be. Enter H230 in the search box to learn more about \"Sleep Apnea: After Your Visit. \"  
© 2150-0758 [employer]. Care instructions adapted under license by [employer] (which disclaims liability or warranty for this information).  This care instruction is for use with your licensed healthcare professional. If you have questions about a medical condition or this instruction, always ask your healthcare professional. Lorenadeb Samss any warranty or liability for your use of this information. PROPER SLEEP HYGIENE What to avoid · Do not have drinks with caffeine, such as coffee or black tea, for 8 hours before bed. · Do not smoke or use other types of tobacco near bedtime. Nicotine is a stimulant and can keep you awake. · Avoid drinking alcohol late in the evening, because it can cause you to wake in the middle of the night. · Do not eat a big meal close to bedtime. If you are hungry, eat a light snack. · Do not drink a lot of water close to bedtime, because the need to urinate may wake you up during the night. · Do not read or watch TV in bed. Use the bed only for sleeping and sexual activity. What to try · Go to bed at the same time every night, and wake up at the same time every morning. Do not take naps during the day. · Keep your bedroom quiet, dark, and cool. · Get regular exercise, but not within 3 to 4 hours of your bedtime. Manuel Napier · Sleep on a comfortable pillow and mattress. · If watching the clock makes you anxious, turn it facing away from you so you cannot see the time. · If you worry when you lie down, start a worry book. Well before bedtime, write down your worries, and then set the book and your concerns aside. · Try meditation or other relaxation techniques before you go to bed. · If you cannot fall asleep, get up and go to another room until you feel sleepy. Do something relaxing. Repeat your bedtime routine before you go to bed again. · Make your house quiet and calm about an hour before bedtime. Turn down the lights, turn off the TV, log off the computer, and turn down the volume on music. This can help you relax after a busy day. Drowsy Driving [employer] cites drowsiness as a causing factor in more than 741,436 police reported crashes annually, resulting in 76,000 injuries and 1,500 deaths. Other surveys suggest 55% of people polled have driven while drowsy in the past year, 23% had fallen asleep but not crashed, 3% crashed, and 2% had and accident due to drowsy driving. Who is at risk? Young Drivers: One study of drowsy driving accidents states that 55% of the drivers were under 25 years. Of those, 75% were male. Shift Workers and Travelers: People who work overnight or travel across time zones frequently are at higher risk of experiencing Circadian Rhythm Disorders. They are trying to work and function when their body is programed to sleep. Sleep Deprived: Lack of sleep has a serious impact on your ability to pay attention or focus on a task. Consistently getting less than the average of 8 hours your body needs creates partial or cumulative sleep deprivation. Untreated Sleep Disorders: Sleep Apnea, Narcolepsy, R.L.S., and other sleep disorders (untreated) prevent a person from getting enough restful sleep. This leads to excessive daytime sleepiness and increases the risk for drowsy driving accidents by up to 7 times. Medications / Alcohol: Even over the counter medications can cause drowsiness. Medications that impair a drivers attention should have a warning label. Alcohol naturally makes you sleepy and on its own can cause accidents. Combined with excessive drowsiness its effects are amplified. Signs of Drowsy Driving: * You don't remember driving the last few miles * You may drift out of your junior * You are unable to focus and your thoughts wander * You may yawn more often than normal 
 * You have difficulty keeping your eyes open / nodding off * Missing traffic signs, speeding, or tailgating Prevention-  
Good sleep hygiene, lifestyle and behavioral choices have the most impact on drowsy driving. There is no substitute for sleep and the average person requires 8 hours nightly.  If you find yourself driving drowsy, stop and sleep. Consider the sleep hygiene tips provided during your visit as well. Medication Refill Policy: Refills for all medications require 1 week advance notice. Please have your pharmacy fax a refill request. We are unable to fax, or call in \"controled substance\" medications and you will need to pick these prescriptions up from our office. [employer] App Activation Thank you for requesting access to [employer] App. Please follow the instructions below to securely access and download your online medical record. [employer] App allows you to send messages to your doctor, view your test results, renew your prescriptions, schedule appointments, and more. How Do I Sign Up? 1. In your internet browser, go to [email or web address]. 2. Click on the First Time User? Click Here link in the Sign In box. You will see the New Member Sign Up page. 3. Enter your [employer] App Access Code exactly as it appears below. You will not need to use this code after youve completed the sign-up process. If you do not sign up before the expiration date, you must request a new code. [employer] App Access Code: [email or web address] Expires: 2018  1:49 PM (This is the date your TV [employer] Wizard App access code will ) 4. Enter the last four digits of your Social Security Number (xxxx) and Date of Birth (mm/dd/yyyy) as indicated and click Submit. You will be taken to the next sign-up page. 5. Create a TV [employer] Wizard App ID. This will be your [employer] Wizard App login ID and cannot be changed, so think of one that is secure and easy to remember. 6. Create a [employer] App password. You can change your password at any time. 7. Enter your Password Reset Question and Answer. This can be used at a later time if you forget your password. 8. Enter your e-mail address. You will receive e-mail notification when new information is available in [address] 9. Click Sign Up. You can now view and download portions of your medical record. 10. Click the Download Summary menu link to download a portable copy of your medical information. Additional Information If you have questions, please call [phone]. Remember, MAZ is NOT to be used for urgent needs. For medical emergencies, dial 911. Introducing [facility]! Kenna Anand introduces MAZ patient portal. Now you can access parts of your medical record, email your doctor's office, and request medication refills online. 1. In your internet browser, go to [email or web address] 2. Click on the First Time User? Click Here link in the Sign In box. You will see the New Member Sign Up page. 3. Enter your MAZ Access Code exactly as it appears below. You will not need to use this code after youve completed the sign-up process. If you do not sign up before the expiration date, you must request a new code. · MAZ Access Code: [email or web address] Expires: 12/11/2018  1:49 PM 
 
4. Enter the last four digits of your Social Security Number (xxxx) and Date of Birth (mm/dd/yyyy) as indicated and click Submit. You will be taken to the next sign-up page. 5. Create a MAZ ID. This will be your MAZ login ID and cannot be changed, so think of one that is secure and easy to remember. 6. Create a MAZ password. You can change your password at any time. 7. Enter your Password Reset Question and Answer. This can be used at a later time if you forget your password. 8. Enter your e-mail address. You will receive e-mail notification when new information is available in [address] 9. Click Sign Up. You can now view and download portions of your medical record. 10. Click the Download Summary menu link to download a portable copy of your medical information. If you have questions, please visit the Frequently Asked Questions section of the MAZ website.  Remember, MAZ is NOT to be used for urgent needs. For medical emergencies, dial 911. Now available from your iPhone and Android! Please provide this summary of care documentation to your next provider. Your primary care clinician is listed as Manual Qualia. If you have any questions after today's visit, please call [phone].

## (undated) DEVICE — STRAP,POSITIONING,KNEE/BODY,FOAM,4X60": Brand: MEDLINE

## (undated) DEVICE — SYR 10ML LUER LOK 1/5ML GRAD --

## (undated) DEVICE — GLOVE ORANGE PI 7   MSG9070

## (undated) DEVICE — GARMENT,MEDLINE,DVT,INT,CALF,MED, GEN2: Brand: MEDLINE

## (undated) DEVICE — HANDLE LT SNAP ON ULT DURABLE LENS FOR TRUMPF ALC DISPOSABLE

## (undated) DEVICE — STERILE POLYISOPRENE POWDER-FREE SURGICAL GLOVES WITH EMOLLIENT COATING: Brand: PROTEXIS

## (undated) DEVICE — SEALER TISS L37CM SHFT DIA5MM 360DEG ROT CVD JAW TAPR TIP

## (undated) DEVICE — DEVICE RELD SZ 0 L8IN GRN ABSRB FOR ENDO SILS STIT DEVS

## (undated) DEVICE — TROCAR ENDOSCP L100MM DIA5MM BLDELSS STBL SL THRD OPT VW

## (undated) DEVICE — TOTAL TRAY, DB, 100% SILI FOLEY, 16FR 10: Brand: MEDLINE

## (undated) DEVICE — GLOVE SURG SZ 6 L12IN FNGR THK79MIL GRN LTX FREE

## (undated) DEVICE — NEEDLE HYPO 22GA L1.5IN BLK S STL HUB POLYPR SHLD REG BVL

## (undated) DEVICE — COVER,TABLE,60X90,STERILE: Brand: MEDLINE

## (undated) DEVICE — TROCAR ENDOSCP L100MM DIA5MM BLDELSS STBL SL OBT RADLUC

## (undated) DEVICE — SUTURING DEVICE: Brand: ENDO STITCH

## (undated) DEVICE — TRAY PREP DRY W/ PREM GLV 2 APPL 6 SPNG 2 UNDPD 1 OVERWRAP

## (undated) DEVICE — BASIC SINGLE BASIN BTC-LF: Brand: MEDLINE INDUSTRIES, INC.

## (undated) DEVICE — TOWEL SURG W17XL27IN STD BLU COT NONFENESTRATED PREWASHED

## (undated) DEVICE — DEVICE TISS REM IU CANSTR VAC TB FT PEDAL DISPOSABLE MYOSURE

## (undated) DEVICE — CATHETER,URETHRAL,REDRUBBER,STRL,16FR: Brand: MEDLINE

## (undated) DEVICE — SPONGE GZ W4XL4IN COT RADPQ HIGHLY ABSRB

## (undated) DEVICE — Device

## (undated) DEVICE — BLADE ASSEMB CLP HAIR FINE --

## (undated) DEVICE — MARKER,SKIN,WI/RULER AND LABELS: Brand: MEDLINE

## (undated) DEVICE — COVER LT HNDL PLAS RIG 1 PER PK

## (undated) DEVICE — GLOVE SURG 7.5 11.7IN BEAD CUF LIGHT BRN SENSICARE LTX FREE

## (undated) DEVICE — PAD,SANITARY,11 IN,MAXI,N-STRL,IND WRAP: Brand: MEDLINE

## (undated) DEVICE — SOLUTION IRRIG 1000ML 0.9% SOD CHL USP POUR PLAS BTL

## (undated) DEVICE — SET SEALS HYSTEROSCOPE DISP -- MYOSURE  EA=10

## (undated) DEVICE — AGENT HEMSTAT W4XL4IN OXIDIZED REGENERATED CELOS ABSRB SFT

## (undated) DEVICE — DRAPE,LITHOTOMY,STERILE: Brand: MEDLINE

## (undated) DEVICE — SUTURE MCRYL SZ 4-0 L27IN ABSRB UD L19MM PS-2 1/2 CIR PRIM Y426H

## (undated) DEVICE — VCARE MEDIUM, UTERINE MANIPULATOR, VAGINAL-CERVICAL-AHLUWALIA'S-RETRACTOR-ELEVATOR: Brand: VCARE

## (undated) DEVICE — TROCARS: Brand: KII® BALLOON BLUNT TIP SYSTEM

## (undated) DEVICE — INFECTION CONTROL KIT SYS

## (undated) DEVICE — GLOVE SURG SZ 6 THK91MIL LTX FREE SYN POLYISOPRENE ANTI

## (undated) DEVICE — INTENDED FOR TISSUE SEPARATION, AND OTHER PROCEDURES THAT REQUIRE A SHARP SURGICAL BLADE TO PUNCTURE OR CUT.: Brand: BARD-PARKER ® CARBON RIB-BACK BLADES

## (undated) DEVICE — SOLUTION IRRIG 3000ML 0.9% SOD CHL FLX CONT 0797208] ICU MEDICAL INC]

## (undated) DEVICE — PAD PT POS 36 IN SURGYPAD DISP

## (undated) DEVICE — GOWN,SIRUS,NONRNF,SETINSLV,XL,20/CS: Brand: MEDLINE

## (undated) DEVICE — Z INACTIVE USE 2527070 DRAPE SURG W40XL44IN UNDERBUTTOCK SMS POLYPR W/ PCH BK DISP

## (undated) DEVICE — GYN LAPAROSCOPY - SMH: Brand: MEDLINE INDUSTRIES, INC.

## (undated) DEVICE — SUTURE SZ 0 27IN 5/8 CIR UR-6  TAPER PT VIOLET ABSRB VICRYL J603H

## (undated) DEVICE — SCISSORS ENDOSCP DIA5MM CRV MPLR CAUT W/ RATCH HNDL